# Patient Record
Sex: MALE | Race: WHITE
[De-identification: names, ages, dates, MRNs, and addresses within clinical notes are randomized per-mention and may not be internally consistent; named-entity substitution may affect disease eponyms.]

---

## 2018-02-07 ENCOUNTER — HOSPITAL ENCOUNTER (EMERGENCY)
Dept: HOSPITAL 62 - ER | Age: 38
LOS: 1 days | Discharge: HOME | End: 2018-02-08
Payer: SELF-PAY

## 2018-02-07 VITALS — DIASTOLIC BLOOD PRESSURE: 73 MMHG | SYSTOLIC BLOOD PRESSURE: 147 MMHG

## 2018-02-07 DIAGNOSIS — F19.10: ICD-10-CM

## 2018-02-07 DIAGNOSIS — Z71.1: Primary | ICD-10-CM

## 2018-02-07 DIAGNOSIS — Z79.899: ICD-10-CM

## 2018-02-07 PROCEDURE — 80307 DRUG TEST PRSMV CHEM ANLYZR: CPT

## 2018-02-07 PROCEDURE — 99283 EMERGENCY DEPT VISIT LOW MDM: CPT

## 2018-02-07 PROCEDURE — 81001 URINALYSIS AUTO W/SCOPE: CPT
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
## 2018-02-08 LAB
APPEARANCE UR: CLEAR
APTT PPP: YELLOW S
BARBITURATES UR QL SCN: NEGATIVE
BILIRUB UR QL STRIP: NEGATIVE
GLUCOSE UR STRIP-MCNC: NEGATIVE MG/DL
KETONES UR STRIP-MCNC: NEGATIVE MG/DL
METHADONE UR QL SCN: NEGATIVE
NITRITE UR QL STRIP: NEGATIVE
PCP UR QL SCN: NEGATIVE
PH UR STRIP: 6 [PH] (ref 5–9)
PROT UR STRIP-MCNC: NEGATIVE MG/DL
SP GR UR STRIP: 1.01
URINE BENZODIAZEPINES SCREEN: NEGATIVE
URINE COCAINE SCREEN: NEGATIVE
URINE MARIJUANA (THC) SCREEN: NEGATIVE
UROBILINOGEN UR-MCNC: NEGATIVE MG/DL (ref ?–2)

## 2018-02-08 NOTE — ER DOCUMENT REPORT
HPI





- HPI


Pain Level: Denies


Notes: 





Patient is a 37-year-old male with a history of chronic narcotic issues who 

presents the ED with [facility]'s department for a medical clearance.  Sure states 

that patient took a Suboxone just prior to being arrested.  According to the 

patient he has been on Suboxone chronically and wanted to take it as he was in 

pain and did not want to go through any withdrawal symptoms.  Otherwise he has 

no other concerns or complaints and is feeling well.  No SI/HI.  Patient states 

that he has been eating and drinking without difficulties.  He is urinating 

normally and having normal bowel movements.  Denies any headache, fever, head 

injury, neck pain, LOC, changes in vision/speech/mentation/hearing, URI, sore 

throat, chest pain, palpitations, syncope, cough, shortness of breath, wheeze, 

dyspnea, abdominal pain, nausea/vomiting/diarrhea, urinary retention, dysuria, 

hematuria, loss of control of bowel or bladder, numbness/tingling, saddle 

anesthesia, muscle paralysis/weakness, or rash.





- ROS


Systems Reviewed and Negative: Yes All other systems reviewed and negative





Past Medical History





- General


Information source: Law Enforcement





- Social History


Smoking Status: Unknown if Ever Smoked


Family History: Reviewed & Not Pertinent





Vertical Provider Document





- CONSTITUTIONAL


Agree With Documented VS: Yes


Notes: 





PHYSICAL EXAMINATION:





GENERAL: Well-appearing, well-nourished and in no acute distress.  A&Ox4.  

Answers questions appropriately. 





HEAD: Atraumatic, normocephalic.





EYES: Pupils equal round and reactive to light, extraocular movements intact, 

sclera anicteric, conjunctiva are normal.





ENT: EAC clear b/l.  TM's intact b/l without erythema, fluid, or perforation.  

Nares patent and without discharge.  oropharynx clear without exudates.  No 

tonsilar hypertrophy or erythema.  Moist mucous membranes.  No sinus tenderness.





NECK: Normal range of motion, supple without lymphadenopathy





LUNGS: Breath sounds clear to auscultation bilaterally and equal.  No wheezes 

rales or rhonchi.





HEART: Regular rate and rhythm without murmurs, rubs, gallops.





ABDOMEN: Soft, nontender, nondistended abdomen.  No guarding, no rebound.  No 

masses appreciated.  Normal bowel sounds present.  No CVA tenderness 

bilaterally.





Musculoskeletal: FROM to passive/active. Strength 5+/5. 





Extremities:  No cyanosis, clubbing, or edema b/l.  Peripheral pulses 2+.  

Capillary refill less than 3 seconds.





NEUROLOGICAL: MMSE intact.  Cranial nerves grossly intact.  Normal speech, 

normal gait.  Normal sensory, motor exams 





PSYCH: Normal mood, normal affect.





SKIN: Warm, Dry, normal turgor, no rashes or lesions noted.





- INFECTION CONTROL


TRAVEL OUTSIDE OF THE [place] IN LAST 30 DAYS: No





- RESPIRATORY


O2 Sat by Pulse Oximetry: 97





Course





- Re-evaluation


Re-evalutation: 





02/08/18 00:31


Patient is an afebrile, well-hydrated, 37-year-old male who presents ED for a 

worried well visit.  Vitals are stable.  PE is otherwise unremarkable.  

Urinalysis was unremarkable for any acute pathology.  Urine drug screen was 

performed and opiates was noted.  Patient does take Suboxone as well.  No other 

labs or imaging warranted at this time based on H&P.  No other lab testing 

warranted.  No SI/HI.  No other acute systemic condition suspected at this 

time.  Recheck with a provider in 3-5 days.  Return to the ED with any worsening

/concerning symptoms otherwise as reviewed discharge.  Patient and  are 

in agreement.





- Vital Signs


Vital signs: 


 











Temp Pulse Resp BP Pulse Ox


 


 98.0 F   70   12   147/73 H  97 


 


 02/07/18 21:36  02/07/18 21:36  02/07/18 21:36  02/07/18 21:36  02/07/18 21:36














Discharge





- Discharge


Clinical Impression: 


 Worried well





Condition: Stable


Disposition: HOME, SELF-CARE


Additional Instructions: 


Maintain adequate fluid and food intake


Tylenol/Motrin if needed


Recheck with provider in 2-3 days


Return to the ED with any worsening symptoms and/or development of fever, 

headache, chest pain, palpitations, syncope, shortness of breath, trouble 

breathing, abdominal pain, n/v/d, blood in stool/urine, loss of control of bowel

/bladder, urinary retention, muscle weakness/paralysis, saddle anesthesia, 

numbness/tingling, or other worsening symptoms that are concerning to you.


Forms:  Elevated Blood Pressure


Referrals: 


[facility] [Provider Group] - Follow up as needed


[facility] [Provider Group] - Follow up as needed

## 2020-11-16 ENCOUNTER — HOSPITAL ENCOUNTER (EMERGENCY)
Dept: HOSPITAL 62 - ER | Age: 40
Discharge: HOME | End: 2020-11-16
Payer: SELF-PAY

## 2020-11-16 VITALS — DIASTOLIC BLOOD PRESSURE: 79 MMHG | SYSTOLIC BLOOD PRESSURE: 117 MMHG
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
## 2020-11-16 DIAGNOSIS — T63.511A: ICD-10-CM

## 2020-11-16 DIAGNOSIS — L03.113: ICD-10-CM

## 2020-11-16 DIAGNOSIS — Z23: ICD-10-CM

## 2020-11-16 DIAGNOSIS — L02.413: Primary | ICD-10-CM

## 2020-11-16 LAB
ADD MANUAL DIFF: NO
ALBUMIN SERPL-MCNC: 3.8 G/DL (ref 3.5–5)
ALP SERPL-CCNC: 75 U/L (ref 38–126)
ANION GAP SERPL CALC-SCNC: 9 MMOL/L (ref 5–19)
AST SERPL-CCNC: 48 U/L (ref 17–59)
BASOPHILS # BLD AUTO: 0.1 10^3/UL (ref 0–0.2)
BASOPHILS NFR BLD AUTO: 0.4 % (ref 0–2)
BILIRUB DIRECT SERPL-MCNC: 0.1 MG/DL (ref 0–0.4)
BILIRUB SERPL-MCNC: 0.7 MG/DL (ref 0.2–1.3)
BUN SERPL-MCNC: 16 MG/DL (ref 7–20)
CALCIUM: 9 MG/DL (ref 8.4–10.2)
CHLORIDE SERPL-SCNC: 98 MMOL/L (ref 98–107)
CO2 SERPL-SCNC: 28 MMOL/L (ref 22–30)
EOSINOPHIL # BLD AUTO: 0 10^3/UL (ref 0–0.6)
EOSINOPHIL NFR BLD AUTO: 0 % (ref 0–6)
ERYTHROCYTE [DISTWIDTH] IN BLOOD BY AUTOMATED COUNT: 13.5 % (ref 11.5–14)
GLUCOSE SERPL-MCNC: 130 MG/DL (ref 75–110)
HCT VFR BLD CALC: 37.9 % (ref 37.9–51)
HGB BLD-MCNC: 12.8 G/DL (ref 13.5–17)
LYMPHOCYTES # BLD AUTO: 1 10^3/UL (ref 0.5–4.7)
LYMPHOCYTES NFR BLD AUTO: 5.6 % (ref 13–45)
MCH RBC QN AUTO: 28.1 PG (ref 27–33.4)
MCHC RBC AUTO-ENTMCNC: 33.7 G/DL (ref 32–36)
MCV RBC AUTO: 84 FL (ref 80–97)
MONOCYTES # BLD AUTO: 1.9 10^3/UL (ref 0.1–1.4)
MONOCYTES NFR BLD AUTO: 10.3 % (ref 3–13)
NEUTROPHILS # BLD AUTO: 15.5 10^3/UL (ref 1.7–8.2)
NEUTS SEG NFR BLD AUTO: 83.7 % (ref 42–78)
PLATELET # BLD: 206 10^3/UL (ref 150–450)
POTASSIUM SERPL-SCNC: 4.2 MMOL/L (ref 3.6–5)
PROT SERPL-MCNC: 7 G/DL (ref 6.3–8.2)
RBC # BLD AUTO: 4.54 10^6/UL (ref 4.35–5.55)
TOTAL CELLS COUNTED % (AUTO): 100 %
WBC # BLD AUTO: 18.6 10^3/UL (ref 4–10.5)

## 2020-11-16 PROCEDURE — 10060 I&D ABSCESS SIMPLE/SINGLE: CPT

## 2020-11-16 PROCEDURE — 73090 X-RAY EXAM OF FOREARM: CPT

## 2020-11-16 PROCEDURE — 87040 BLOOD CULTURE FOR BACTERIA: CPT

## 2020-11-16 PROCEDURE — 96372 THER/PROPH/DIAG INJ SC/IM: CPT

## 2020-11-16 PROCEDURE — 96365 THER/PROPH/DIAG IV INF INIT: CPT

## 2020-11-16 PROCEDURE — 87077 CULTURE AEROBIC IDENTIFY: CPT

## 2020-11-16 PROCEDURE — 99284 EMERGENCY DEPT VISIT MOD MDM: CPT

## 2020-11-16 PROCEDURE — 36415 COLL VENOUS BLD VENIPUNCTURE: CPT

## 2020-11-16 PROCEDURE — 87150 DNA/RNA AMPLIFIED PROBE: CPT

## 2020-11-16 PROCEDURE — 90471 IMMUNIZATION ADMIN: CPT

## 2020-11-16 PROCEDURE — 80053 COMPREHEN METABOLIC PANEL: CPT

## 2020-11-16 PROCEDURE — 85025 COMPLETE CBC W/AUTO DIFF WBC: CPT

## 2020-11-16 PROCEDURE — 90715 TDAP VACCINE 7 YRS/> IM: CPT

## 2020-11-16 NOTE — RADIOLOGY REPORT (SQ)
EXAM DESCRIPTION:  FOREARM RIGHT



IMAGES COMPLETED DATE/TIME:  11/16/2020 12:40 pm



REASON FOR STUDY:  By stingray week ago now infected



COMPARISON:  None.



NUMBER OF VIEWS:  Two views.



TECHNIQUE:  Two radiographic images acquired of the right forearm, including elbow and wrist in at le
ast one projection.



LIMITATIONS:  None.



FINDINGS:  MINERALIZATION: Normal.

BONES: No acute fracture.  No worrisome bone lesions.

SOFT TISSUES: There is soft tissue swelling at the area marked.  No radiopaque foreign body is seen.

OTHER: No other significant finding.



IMPRESSION:  Soft tissue swelling.  No foreign body is seen.  No osseous abnormality.



TECHNICAL DOCUMENTATION:  JOB ID:  [id number]

 2011 [employer]- All Rights Reserved



Reading location - IP/workstation name: [email or web address]
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
## 2020-11-16 NOTE — ER DOCUMENT REPORT
ED Medical Screen (RME)





- General


Chief Complaint: Arm Pain


Stated Complaint: ARM SWOLLEN, SKIN PROBLEM


Time Seen by Provider: 11/16/20 12:04


Mode of Arrival: Ambulatory


Information source: Patient


Notes: 





40-year-old male presented to ED for red swollen tight right forearm.  He states

he was stung by a stingray a week ago he did not get follow-up treatment.  He 

states that the arm started swelling about 3 days ago.  He states within the 

last 24 hours he has noticed that the arm has been getting larger by the elbow. 

He states the pain is after changing.  He has movement to the left fingers but 

states is hard to move them.  His left hand is cold.  Patient is alert oriented 

respirations regular nonlabored speaking in full sentences.  I have told the 

charge nurse concerning the condition of this arm.  I have asked her to get him 

a room promptly.














I have greeted and performed a rapid initial assessment of this patient.  A 

comprehensive ED assessment and evaluation of the patient, analysis of test 

results and completion of medical decision making process will be conducted by 

an additional ED providers.


TRAVEL OUTSIDE OF THE [place] IN LAST 30 DAYS: No





- Related Data


Allergies/Adverse Reactions: 


                                        





No Known Allergies Allergy (Verified 11/16/20 12:06)


   











Physical Exam





- Vital signs


Vitals: 





                                        











Temp Pulse Resp BP Pulse Ox


 


 98.0 F   108 H  18   124/67   100 


 


 11/16/20 11:43  11/16/20 11:43  11/16/20 11:43  11/16/20 11:43  11/16/20 11:43














Course





- Vital Signs


Vital signs: 





                                        











Temp Pulse Resp BP Pulse Ox


 


 98.0 F   108 H  18   124/67   100 


 


 11/16/20 11:43  11/16/20 11:43  11/16/20 11:43  11/16/20 11:43  11/16/20 11:43

## 2020-11-17 ENCOUNTER — HOSPITAL ENCOUNTER (INPATIENT)
Dept: HOSPITAL 62 - ER | Age: 40
LOS: 5 days | Discharge: HOME | DRG: 580 | End: 2020-11-22
Attending: INTERNAL MEDICINE | Admitting: FAMILY MEDICINE
Payer: COMMERCIAL

## 2020-11-17 DIAGNOSIS — L03.113: Primary | ICD-10-CM

## 2020-11-17 DIAGNOSIS — B95.62: ICD-10-CM

## 2020-11-17 DIAGNOSIS — L02.413: ICD-10-CM

## 2020-11-17 DIAGNOSIS — Z11.59: ICD-10-CM
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
## 2020-11-17 DIAGNOSIS — F19.130: ICD-10-CM
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
## 2020-11-17 LAB
ADD MANUAL DIFF: NO
ALBUMIN SERPL-MCNC: 3.2 G/DL (ref 3.5–5)
ALP SERPL-CCNC: 72 U/L (ref 38–126)
ANION GAP SERPL CALC-SCNC: 7 MMOL/L (ref 5–19)
AST SERPL-CCNC: 45 U/L (ref 17–59)
BASOPHILS # BLD AUTO: 0 10^3/UL (ref 0–0.2)
BASOPHILS NFR BLD AUTO: 0.4 % (ref 0–2)
BILIRUB DIRECT SERPL-MCNC: 0.2 MG/DL (ref 0–0.4)
BILIRUB SERPL-MCNC: 0.6 MG/DL (ref 0.2–1.3)
BUN SERPL-MCNC: 12 MG/DL (ref 7–20)
CALCIUM: 8.5 MG/DL (ref 8.4–10.2)
CHLORIDE SERPL-SCNC: 99 MMOL/L (ref 98–107)
CO2 SERPL-SCNC: 26 MMOL/L (ref 22–30)
EOSINOPHIL # BLD AUTO: 0 10^3/UL (ref 0–0.6)
EOSINOPHIL NFR BLD AUTO: 0.1 % (ref 0–6)
ERYTHROCYTE [DISTWIDTH] IN BLOOD BY AUTOMATED COUNT: 13.6 % (ref 11.5–14)
GLUCOSE SERPL-MCNC: 138 MG/DL (ref 75–110)
HCT VFR BLD CALC: 36.6 % (ref 37.9–51)
HGB BLD-MCNC: 12.4 G/DL (ref 13.5–17)
INR PPP: 1.19
LYMPHOCYTES # BLD AUTO: 0.8 10^3/UL (ref 0.5–4.7)
LYMPHOCYTES NFR BLD AUTO: 6.7 % (ref 13–45)
MCH RBC QN AUTO: 28.1 PG (ref 27–33.4)
MCHC RBC AUTO-ENTMCNC: 33.9 G/DL (ref 32–36)
MCV RBC AUTO: 83 FL (ref 80–97)
MONOCYTES # BLD AUTO: 1.2 10^3/UL (ref 0.1–1.4)
MONOCYTES NFR BLD AUTO: 10 % (ref 3–13)
NEUTROPHILS # BLD AUTO: 10.3 10^3/UL (ref 1.7–8.2)
NEUTS SEG NFR BLD AUTO: 82.8 % (ref 42–78)
PLATELET # BLD: 190 10^3/UL (ref 150–450)
POTASSIUM SERPL-SCNC: 3.8 MMOL/L (ref 3.6–5)
PROT SERPL-MCNC: 6.1 G/DL (ref 6.3–8.2)
PROTHROMBIN TIME: 15.3 SEC (ref 11.4–15.4)
RBC # BLD AUTO: 4.42 10^6/UL (ref 4.35–5.55)
TOTAL CELLS COUNTED % (AUTO): 100 %
WBC # BLD AUTO: 12.5 10^3/UL (ref 4–10.5)

## 2020-11-17 PROCEDURE — 0J9G0ZZ DRAINAGE OF RIGHT LOWER ARM SUBCUTANEOUS TISSUE AND FASCIA, OPEN APPROACH: ICD-10-PCS | Performed by: ORTHOPAEDIC SURGERY

## 2020-11-17 PROCEDURE — 83605 ASSAY OF LACTIC ACID: CPT

## 2020-11-17 PROCEDURE — 36415 COLL VENOUS BLD VENIPUNCTURE: CPT

## 2020-11-17 PROCEDURE — 87205 SMEAR GRAM STAIN: CPT

## 2020-11-17 PROCEDURE — 93306 TTE W/DOPPLER COMPLETE: CPT

## 2020-11-17 PROCEDURE — 87070 CULTURE OTHR SPECIMN AEROBIC: CPT

## 2020-11-17 PROCEDURE — 87040 BLOOD CULTURE FOR BACTERIA: CPT

## 2020-11-17 PROCEDURE — 85025 COMPLETE CBC W/AUTO DIFF WBC: CPT

## 2020-11-17 PROCEDURE — 80202 ASSAY OF VANCOMYCIN: CPT

## 2020-11-17 PROCEDURE — 87186 SC STD MICRODIL/AGAR DIL: CPT

## 2020-11-17 PROCEDURE — 87077 CULTURE AEROBIC IDENTIFY: CPT

## 2020-11-17 PROCEDURE — 82565 ASSAY OF CREATININE: CPT

## 2020-11-17 PROCEDURE — A9576 INJ PROHANCE MULTIPACK: HCPCS

## 2020-11-17 PROCEDURE — 85652 RBC SED RATE AUTOMATED: CPT

## 2020-11-17 PROCEDURE — 01810 ANES PX NRV MUSC F/ARM WRST: CPT

## 2020-11-17 PROCEDURE — 87075 CULTR BACTERIA EXCEPT BLOOD: CPT

## 2020-11-17 PROCEDURE — 85610 PROTHROMBIN TIME: CPT

## 2020-11-17 PROCEDURE — 87635 SARS-COV-2 COVID-19 AMP PRB: CPT

## 2020-11-17 PROCEDURE — C9803 HOPD COVID-19 SPEC COLLECT: HCPCS

## 2020-11-17 PROCEDURE — 85027 COMPLETE CBC AUTOMATED: CPT

## 2020-11-17 PROCEDURE — 80048 BASIC METABOLIC PNL TOTAL CA: CPT

## 2020-11-17 PROCEDURE — 80053 COMPREHEN METABOLIC PANEL: CPT

## 2020-11-17 PROCEDURE — 86140 C-REACTIVE PROTEIN: CPT

## 2020-11-17 PROCEDURE — 96365 THER/PROPH/DIAG IV INF INIT: CPT

## 2020-11-17 PROCEDURE — 96375 TX/PRO/DX INJ NEW DRUG ADDON: CPT

## 2020-11-17 PROCEDURE — 99285 EMERGENCY DEPT VISIT HI MDM: CPT

## 2020-11-17 RX ADMIN — MORPHINE SULFATE ONE MG: 10 INJECTION INTRAMUSCULAR; INTRAVENOUS; SUBCUTANEOUS at 18:36

## 2020-11-17 RX ADMIN — OXYCODONE AND ACETAMINOPHEN PRN TAB: 5; 325 TABLET ORAL at 21:56

## 2020-11-17 RX ADMIN — CEFAZOLIN SCH MLS/HR: 1 INJECTION, POWDER, FOR SOLUTION INTRAVENOUS at 15:19

## 2020-11-17 RX ADMIN — MORPHINE SULFATE ONE MG: 10 INJECTION INTRAMUSCULAR; INTRAVENOUS; SUBCUTANEOUS at 18:50

## 2020-11-17 RX ADMIN — CEFAZOLIN SCH MLS/HR: 1 INJECTION, POWDER, FOR SOLUTION INTRAVENOUS at 22:07

## 2020-11-17 RX ADMIN — DOXYCYCLINE SCH MLS/HR: 100 INJECTION, POWDER, LYOPHILIZED, FOR SOLUTION INTRAVENOUS at 13:13

## 2020-11-17 RX ADMIN — MORPHINE SULFATE ONE MG: 10 INJECTION INTRAMUSCULAR; INTRAVENOUS; SUBCUTANEOUS at 18:41

## 2020-11-17 RX ADMIN — DOXYCYCLINE SCH MLS/HR: 100 INJECTION, POWDER, LYOPHILIZED, FOR SOLUTION INTRAVENOUS at 22:08

## 2020-11-17 NOTE — ER DOCUMENT REPORT
ED General





- General


Chief Complaint: Wound Recheck


Stated Complaint: WOUND RECHECK


Time Seen by Provider: 11/17/20 09:39


Mode of Arrival: Ambulatory


Information source: Patient


Notes: 





Patient is a 40-year-old male presenting to the emergency department chief 

complaint of wound check.  Patient states that about a week ago he was stung by 

a stingray he was seen here at this hospital yesterday for swelling to the right

upper extremity had incision and drainage of the site however today states that 

the swelling is worse the pain is worse and is here for reevaluation.  Patient 

further states that he has not been able to  his prescription of 

antibiotics or pain medicine because of time constraints last evening.  Patient 

has no other complaints no nausea vomiting diarrhea fevers chills cough or cold 

symptoms.


TRAVEL OUTSIDE OF THE [place] IN LAST 30 DAYS: No





- HPI


Onset: Last week


Onset/Duration: Persistent, Worse


Quality of pain: Throbbing


Severity: Moderate


Pain Level: 4


Associated symptoms: None


Exacerbated by: Movement


Relieved by: Denies


Similar symptoms previously: Yes


Recently seen / treated by doctor: Yes





- Related Data


Allergies/Adverse Reactions: 


                                        





No Known Allergies Allergy (Verified 11/16/20 12:06)


   











Past Medical History





- General


Information source: Patient, [facility] Records





- Social History


Smoking Status: Never Smoker


Chew tobacco use (# tins/day): No


Frequency of alcohol use: Rare


Drug Abuse: None


Family History: Reviewed & Not Pertinent


Patient has suicidal ideation: No


Patient has homicidal ideation: No





Review of Systems





- Review of Systems


Constitutional: No symptoms reported


EENT: No symptoms reported


Cardiovascular: No symptoms reported


Respiratory: No symptoms reported


Gastrointestinal: No symptoms reported


Genitourinary: No symptoms reported


Male Genitourinary: No symptoms reported


Musculoskeletal: See HPI


Skin: No symptoms reported


Hematologic/Lymphatic: See HPI


Neurological/Psychological: No symptoms reported


-: Yes All other systems reviewed and negative





Physical Exam





- Vital signs


Vitals: 


                                        











Temp Pulse Resp BP Pulse Ox


 


 98.8 F   84   18   123/62   100 


 


 11/17/20 08:59  11/17/20 08:59  11/17/20 08:59  11/17/20 08:59  11/17/20 08:59














- Notes


Notes: 





PHYSICAL EXAMINATION:


 


GENERAL: Well-appearing, well-nourished and in no acute distress.


 


HEAD: Atraumatic, normocephalic.


 


EYES: Pupils equal round and reactive to light, extraocular movements intact, 

sclera anicteric, conjunctiva are normal.


 


ENT: nares patent, oropharynx clear without exudates.  Moist mucous membranes.


 


NECK: Normal range of motion, supple without lymphadenopathy, no appreciable JVD


 


LUNGS: Lungs clear to auscultation bilaterally and equal.  No wheezes rales or 

rhonchi.


 


HEART: Tachycardic rate and rhythm without murmurs


 


ABDOMEN: Soft, nontender, normal bowel sounds.  No guarding, no rebound.  No 

masses appreciated.


 


EXTREMITIES: Active full range of motion x3, no pitting or edema.  No cyanosis. 

2+ pulses x4.  Abnormality is right upper extremity is markedly swollen 

erythematous and painful both with and without palpation.  Patient is only able 

to flex and extend at the digits approximately 1 cm capillary refill less than 3

seconds.  The area of erythema is from the tip of the fingers to mid axillary.  

This area was marked with a pen.


 


NEUROLOGICAL: No focal neurological deficits. Moves all extremities 

spontaneously and on command.


 


SKIN: Warm, Dry, and intact. Normal turgor, no rashes or lesions noted.  Except 

as stated above





Course





- Re-evaluation


Re-evalutation: 





11/17/20 11:17


At 11 AM I was able to speak with Dr. Artis orthopedics on-call he has 

requested that the patient have an MRI of the right upper extremity to evaluate 

for possible deep fluid collection.  Shortly thereafter I spoke with the 

hospitalist who is going to come down and evaluate the patient.  Subsequently I 

was able to walk-in and let the patient know his status as of right now I have 

reviewed his laboratory studies and patient is feeling somewhat better he is 

currently resting and is agreeable and aware of the need for admission.


11/17/20 11:43


At this time I spoke to the hospitalist who is agreeable with admission of the 

patient.  Currently pending MRI of right upper extremity and evaluation by 

orthopedics.





- Vital Signs


Vital signs: 


                                        











Temp Pulse Resp BP Pulse Ox


 


 98.8 F   84   18   123/62   100 


 


 11/17/20 08:59  11/17/20 08:59  11/17/20 08:59  11/17/20 08:59  11/17/20 08:59














- Laboratory


Result Diagrams: 


                                 11/17/20 10:08





                                 11/17/20 10:08


Laboratory results interpreted by me: 


                                        











  11/17/20 11/17/20





  10:08 10:08


 


WBC  12.5 H 


 


Hgb  12.4 L 


 


Hct  36.6 L 


 


Lymph % (Auto)  6.7 L 


 


Absolute Neuts (auto)  10.3 H 


 


Seg Neutrophils %  82.8 H 


 


Sodium   132.2 L


 


Glucose   138 H


 


ALT   78 H


 


Total Protein   6.1 L


 


Albumin   3.2 L














- Diagnostic Test


Radiology reviewed: Pending, Reports reviewed





Discharge





- Discharge


Clinical Impression: 


 Cellulitis of right forearm, Swelling of right upper extremity





Condition: Fair


Disposition: ADMITTED AS OBSERVATION


Admitting Provider: Ilana (Hospitalist)


Unit Admitted: Medical Floor

## 2020-11-17 NOTE — RADIOLOGY REPORT (SQ)
MR UPPER EXTREMITY WITHOUT THEN WITH IV CONTRAST



HISTORY: Stingray injury. Drainage and wound in the right

forearm.



COMPARISON: None.



TECHNIQUE: Multiplanar, multisequence MR imaging of the right

forearm was performed without and with the administration of

intravenous gadolinium.



FINDINGS:



There is diffuse subcutaneous edema with enhancement throughout

the right forearm. There are multiple areas of nonenhancement,

however no discrete rim-enhancing fluid collection is identified.

There is surgical packing material within the dorsal subcutaneous

tissues of the mid forearm. No evidence of acute fracture or

osteomyelitis. There is no abnormal intramuscular signal. The

visualized tendons are intact.



IMPRESSION:



1.  Diffuse edema overlying the right forearm which may represent

cellulitis, but without evidence of abscess.

2.  Surgical packing material within the subcutaneous tissues of

the dorsal mid forearm. No additional foreign body is seen.

## 2020-11-17 NOTE — PDOC CONSULTATION
Consultation


Consult Date: 11/17/20


Provider Consulted: PAOLO LARA





History of Present Illness


Admission Date/PCP: 


  11/17/20 12:04





  





Patient complains of: Right forearm infection


History of Present Illness: 


SHERRY BALBUENA is a 40 year old male who sustained a puncture wound to his 

right forearm this past weekend he then presented to emergency room yesterday 

11/16/2020 with a superficial abscess.  Bedside irrigation debridement was 

performed and patient was started on doxycycline.  Patient states over the next 

24 hours his symptoms did not improve continued to have significant pain 

swelling and redness.  Patient presents emergency room once again where he was 

started on IV antibiotics he states these antibiotics did improve his pain 

although continues to have swelling and redness.  Denies numbness or tingling 

but has difficulty moving his finger secondary to the pain.  Pain 5/5.  Notes 

fever and chills.





Social History


Smoking Status: Never Smoker





Family History


Family History: Reviewed & Not Pertinent


Parental Family History Reviewed: No


Children Family History Reviewed: No


Sibling(s) Family History Reviewed.: No





Medication/Allergy


Home Medications: 








Doxycycline Monohydrate 100 mg PO BID #20 capsule 11/16/20 


Oxycodone HCl/Acetaminophen [Percocet 5-325 mg Tablet] 1 tab PO Q6H PRN 3 Days 

#15 tablet 11/16/20 








Allergies/Adverse Reactions: 


                                        





No Known Allergies Allergy (Verified 11/16/20 12:06)


   











Review of Systems


Constitutional: PRESENT: as per HPI.  ABSENT: chills, fever(s), headache(s), 

weight gain, weight loss


Eyes: ABSENT: visual disturbances


Ears: ABSENT: hearing changes


Cardiovascular: ABSENT: chest pain, dyspnea on exertion, edema, orthropnea, 

palpitations


Respiratory: ABSENT: cough, hemoptysis


Gastrointestinal: ABSENT: abdominal pain, constipation, diarrhea, hematemesis, 

hematochezia, nausea, vomiting


Genitourinary: ABSENT: dysuria, hematuria


Integumentary: ABSENT: rash, wounds


Neurological: ABSENT: abnormal gait, abnormal speech, confusion, dizziness, 

focal weakness, syncope


Psychiatric: ABSENT: anxiety, depression, homidical ideation, suicidal ideation


Endocrine: ABSENT: cold intolerance, heat intolerance, menstrual abnormalities, 

polydipsia, polyuria


Hematologic/Lymphatic: ABSENT: easy bleeding, easy bruising, lymphadenopathy





Physical Exam


Vital Signs: 


                                        











Temp Pulse Resp BP Pulse Ox


 


 99.5 F   76   15   112/53 L  98 


 


 11/17/20 13:22  11/17/20 13:22  11/17/20 13:22  11/17/20 13:22  11/17/20 13:22








                                 Intake & Output











 11/16/20 11/17/20 11/18/20





 06:59 06:59 06:59


 


Intake Total   50


 


Balance   50


 


Weight   86.2 kg











General appearance: PRESENT: no acute distress, well-developed, well-nourished


Head exam: PRESENT: atraumatic, normocephalic


Eye exam: PRESENT: conjunctiva pink, EOMI, PERRLA.  ABSENT: scleral icterus


Ear exam: PRESENT: normal external ear exam


Mouth exam: PRESENT: moist, tongue midline


Neck exam: PRESENT: full ROM.  ABSENT: carotid bruit, JVD, lymphadenopathy, 

thyromegaly


Cardiovascular exam: PRESENT: RRR.  ABSENT: diastolic murmur, rubs, systolic 

murmur


Pulses: PRESENT: normal dorsalis pedis pul, +2 pedal pulses bilateral


Vascular exam: PRESENT: normal capillary refill


GI/Abdominal exam: PRESENT: normal bowel sounds, soft.  ABSENT: distended, 

guarding, mass, organolmegaly, rebound, tenderness


Rectal exam: PRESENT: deferred


Musculoskeletal exam: PRESENT: other -  compressible no sign of compartment s

yndrome.


Additional comments: 





Right forearm: 3 cm incision noted along the dorsal aspect of the forearm with 

surrounding erythema.  Erythema improving compared to demarcated area.  Palpable

axillary lymph node.  Tenderness with palpation.  Compartments swollen but 

compressible no sign of compartment syndrome.  Intact IP/MP flexion/extension.  

EPL/FPL intact however patient unable to make full composite fist.  Radial pulse

2+.  Cap refill less than 2 seconds.  Mild pain posteriorly with passive 

stretch.


Neurological exam: PRESENT: alert, awake, oriented to person, oriented to place,

oriented to time, oriented to situation, CN II-XII grossly intact.  ABSENT: 

motor sensory deficit


Psychiatric exam: PRESENT: appropriate affect, normal mood.  ABSENT: homicidal 

ideation, suicidal ideation


Skin exam: PRESENT: dry, intact, warm.  ABSENT: cyanosis, rash





Results


Laboratory Results: 


                                        





                                 11/17/20 10:08 





                                 11/17/20 10:08 





                                        











  11/17/20 11/17/20 11/17/20





  10:08 10:08 10:08


 


WBC  12.5 H  


 


RBC  4.42  


 


Hgb  12.4 L  


 


Hct  36.6 L  


 


MCV  83  


 


MCH  28.1  


 


MCHC  33.9  


 


RDW  13.6  


 


Plt Count  190  


 


Seg Neutrophils %  82.8 H  


 


Sodium   132.2 L 


 


Potassium   3.8 


 


Chloride   99 


 


Carbon Dioxide   26 


 


Anion Gap   7 


 


BUN   12 


 


Creatinine   0.58 


 


Est GFR ( Amer)   > 60 


 


Glucose   138 H 


 


Lactic Acid    1.6


 


Calcium   8.5 


 


Total Bilirubin   0.6 


 


AST   45 


 


Alkaline Phosphatase   72 


 


Total Protein   6.1 L 


 


Albumin   3.2 L 














Assessment & Plan





- Diagnosis


(1) Abscess of right forearm


Is this a current diagnosis for this admission?: Yes   


Plan: 


Patient has evidence of persistent right forearm abscess despite irrigation and 

debridement yesterday the emergency room his symptoms have worsened.  At this 

point I have recommended obtaining MRI to evaluate for deeper fluid collection 

in the meantime he only should be prepared for surgery which would include 

irrigation Beemer of the right forearm.  Risk and benefits of surgical procedure

have been explained patient is verbalized understanding consented for surgical p

rocedure.  Risk of surgical procedure neurovascular is, postoperative pain, 

postoperative stiffness, worsening infection requiring additional surgical 

intervention.  Rapid Covid test will be performed in anticipation for surgical 

procedure.  Patient will be admitted for IV antibiotics as well.

## 2020-11-17 NOTE — OPERATIVE REPORT
Operative Report


DATE OF SURGERY: 11/17/20


PREOPERATIVE DIAGNOSIS: Right forearm abscess


POSTOPERATIVE DIAGNOSIS: Same


OPERATION: Irrigation and debridement right forearm abscess


SURGEON: PAOLO LARA


ANESTHESIA: GA


TISSUE REMOVED OR ALTERED: Aerobic anaerobic cultures


COMPLICATIONS: 





None


ESTIMATED BLOOD LOSS: Minimal


PROCEDURE: 





Indication for above procedure:





40-year-old male who apparently sustained a stingray injury to his right 

forearm.  Was seen at the emergency room where bedside irrigation debridement 

was performed however patient continued to have discomfort and swelling thus was

admitted to the hospital service given patient's worsening swelling decision was

made to proceed with operative intervention risk and benefits of surgical 

procedure were explained patient verbalized understanding consented for surgery 

procedure.





Procedure In Detail:





Patient was seen and evaluated in the preoperative holding area.  The RIGHT 

upper extremity was initialized and marked.  Patient received 2g of Ancef IV for

bacterial prophylaxis.  Patient was taken back to the operative room where 

transferred to the operative table and placed under general anesthesia.  Once 

they were adequately anesthetized a nonsterile tourniquet was placed on the 

upper extremity.  A surgical team debriefing was performed ensuring all 

instrumentation was available, the surgical procedure was discussed with 

possible concerns reviewed.  The upper extremity was prepped with Betadine and 

draped in a sterile fashion.   A timeout was done identifying correct patient, 

procedure and extremity everyone in attendance agree with this and verbalized no

concerns. The extremity was elevated the tourniquet was inflated to 250 mmHg.





Previous incision was extended proximally and distally 3 cm respectively.  Blunt

dissection was performed.  There was injury to a small branch of the posterior 

cutaneous nerve of the forearm adjacent to the saphenous vein.  Small venous 

tributaries were coagulated with cautery.  The defect within the fascia along 

the ECRB and ECRL was then opened.  Approximately 8 cc of purulent material was 

expressed there is no evidence of deep track to the bone or deep to the fascia 

of the extensor mechanism.  There is no tracking proximal to the forearm or 

along the volar compartments.  Any nonviable tissue deep to the fascia was 

excised.  Aerobic and anaerobic cultures were obtained.  Wound was irrigated 

with 1 L of normal saline with bacitracin.  Tourniquet was then deflated.  

Residual peripheral bleeding was controlled with cautery.  Skin incision was 

closed with interrupted 3-0 nylon suture and a Penrose drain placed along the 

central aspect of the wound.  Xeroform, gauze and soft dressing was placed.





Sponge counts, instrument counts, needle counts were correct.  Patient was then 

awoken from anesthesia.  Transferred from the operating room table to the 

operating room stretcher.  There was no intraoperative complications patient 

tolerated procedure well stable to PACU.





Postop plan:





Patient will continue on IV antibiotics until he sees clinical improvement plan 

will be for drain removal in 48 hours possibly discharge in 48-72 hours.

## 2020-11-18 LAB
ANION GAP SERPL CALC-SCNC: 13 MMOL/L (ref 5–19)
BUN SERPL-MCNC: 7 MG/DL (ref 7–20)
CALCIUM: 9.1 MG/DL (ref 8.4–10.2)
CHLORIDE SERPL-SCNC: 102 MMOL/L (ref 98–107)
CO2 SERPL-SCNC: 24 MMOL/L (ref 22–30)
ERYTHROCYTE [DISTWIDTH] IN BLOOD BY AUTOMATED COUNT: 13.6 % (ref 11.5–14)
GLUCOSE SERPL-MCNC: 119 MG/DL (ref 75–110)
HCT VFR BLD CALC: 38.3 % (ref 37.9–51)
HGB BLD-MCNC: 12.8 G/DL (ref 13.5–17)
MCH RBC QN AUTO: 27.8 PG (ref 27–33.4)
MCHC RBC AUTO-ENTMCNC: 33.4 G/DL (ref 32–36)
MCV RBC AUTO: 83 FL (ref 80–97)
PLATELET # BLD: 220 10^3/UL (ref 150–450)
POTASSIUM SERPL-SCNC: 4.5 MMOL/L (ref 3.6–5)
RBC # BLD AUTO: 4.6 10^6/UL (ref 4.35–5.55)
WBC # BLD AUTO: 15.6 10^3/UL (ref 4–10.5)

## 2020-11-18 RX ADMIN — CEFAZOLIN SCH MLS/HR: 1 INJECTION, POWDER, FOR SOLUTION INTRAVENOUS at 03:38

## 2020-11-18 RX ADMIN — CEFAZOLIN SCH MLS/HR: 1 INJECTION, POWDER, FOR SOLUTION INTRAVENOUS at 16:06

## 2020-11-18 RX ADMIN — DOXYCYCLINE SCH MLS/HR: 100 INJECTION, POWDER, LYOPHILIZED, FOR SOLUTION INTRAVENOUS at 11:04

## 2020-11-18 RX ADMIN — OXYCODONE AND ACETAMINOPHEN PRN TAB: 5; 325 TABLET ORAL at 12:16

## 2020-11-18 RX ADMIN — DOXYCYCLINE SCH MLS/HR: 100 INJECTION, POWDER, LYOPHILIZED, FOR SOLUTION INTRAVENOUS at 22:48

## 2020-11-18 RX ADMIN — MORPHINE SULFATE PRN MG: 10 INJECTION INTRAMUSCULAR; INTRAVENOUS; SUBCUTANEOUS at 02:06

## 2020-11-18 RX ADMIN — OXYCODONE AND ACETAMINOPHEN PRN TAB: 5; 325 TABLET ORAL at 17:58

## 2020-11-18 RX ADMIN — KETOROLAC TROMETHAMINE SCH MG: 30 INJECTION, SOLUTION INTRAMUSCULAR at 21:06

## 2020-11-18 RX ADMIN — OXYCODONE AND ACETAMINOPHEN PRN TAB: 5; 325 TABLET ORAL at 04:47

## 2020-11-18 RX ADMIN — CEFAZOLIN SCH MLS/HR: 1 INJECTION, POWDER, FOR SOLUTION INTRAVENOUS at 21:07

## 2020-11-18 RX ADMIN — KETOROLAC TROMETHAMINE SCH MG: 30 INJECTION, SOLUTION INTRAMUSCULAR at 08:56

## 2020-11-18 RX ADMIN — CEFAZOLIN SCH MLS/HR: 1 INJECTION, POWDER, FOR SOLUTION INTRAVENOUS at 08:56

## 2020-11-18 RX ADMIN — MORPHINE SULFATE PRN MG: 10 INJECTION INTRAMUSCULAR; INTRAVENOUS; SUBCUTANEOUS at 22:57

## 2020-11-18 RX ADMIN — KETOROLAC TROMETHAMINE SCH MG: 30 INJECTION, SOLUTION INTRAMUSCULAR at 16:06

## 2020-11-18 NOTE — PDOC PROGRESS REPORT
Subjective


Date:: 11/18/20


Reason For Visit: 


RIGHT ARM CELLULITIS








Physical Exam


Vital Signs: 


                                        











Temp Pulse Resp BP Pulse Ox


 


 97.3 F   57 L  20   102/52 L  99 


 


 11/18/20 11:19  11/18/20 11:19  11/18/20 11:19  11/18/20 11:19  11/18/20 11:19








                                 Intake & Output











 11/17/20 11/18/20 11/19/20





 06:59 06:59 06:59


 


Intake Total  2950 


 


Output Total  1805 


 


Balance  1145 


 


Weight  80.7 kg 











General appearance: PRESENT: no acute distress, well-developed, well-nourished


Head exam: PRESENT: atraumatic, normocephalic


Eye exam: PRESENT: conjunctiva pink, EOMI, PERRLA.  ABSENT: scleral icterus


Ear exam: PRESENT: normal external ear exam


Mouth exam: PRESENT: moist, tongue midline


Neck exam: ABSENT: carotid bruit, JVD, lymphadenopathy, thyromegaly


Respiratory exam: PRESENT: clear to auscultation stephanie.  ABSENT: rales, rhonchi, 

wheezes


Cardiovascular exam: PRESENT: RRR, +S1, +S2.  ABSENT: diastolic murmur, rubs, 

systolic murmur


Pulses: PRESENT: normal dorsalis pedis pul


Vascular exam: PRESENT: normal capillary refill


GI/Abdominal exam: PRESENT: normal bowel sounds, soft.  ABSENT: distended, 

guarding, mass, organolmegaly, rebound, tenderness


Rectal exam: PRESENT: deferred


Extremities exam: PRESENT: other - RUE in dressing.  ABSENT: calf tenderness, 

clubbing, pedal edema


Neurological exam: PRESENT: alert, awake, oriented to person, oriented to place,

oriented to time, oriented to situation, CN II-XII grossly intact.  ABSENT: 

motor sensory deficit


Psychiatric exam: PRESENT: appropriate affect, normal mood.  ABSENT: homicidal 

ideation, suicidal ideation


Skin exam: PRESENT: dry, intact, warm.  ABSENT: cyanosis, rash





Results


Laboratory Results: 


                                        





                                 11/18/20 04:18 





                                 11/18/20 04:18 





                                        











  11/18/20 11/18/20





  04:18 04:18


 


WBC  15.6 H 


 


RBC  4.60 


 


Hgb  12.8 L 


 


Hct  38.3 


 


MCV  83 


 


MCH  27.8 


 


MCHC  33.4 


 


RDW  13.6 


 


Plt Count  220 


 


Sodium   139.1


 


Potassium   4.5


 


Chloride   102


 


Carbon Dioxide   24


 


Anion Gap   13


 


BUN   7


 


Creatinine   0.59


 


Est GFR (African Amer)   > 60


 


Glucose   119 H


 


Calcium   9.1








                                        





11/17/20 09:50   Arm - Right   Gram Stain - Final








Impressions: 


                                        





Upper Extremity MRI  11/17/20 11:06


IMPRESSION:


 


1.  Diffuse edema overlying the right forearm which may represent


cellulitis, but without evidence of abscess.


2.  Surgical packing material within the subcutaneous tissues of


the dorsal mid forearm. No additional foreign body is seen.


 














Assessment and Plan





- Diagnosis


(1) Cellulitis of right forearm


Is this a current diagnosis for this admission?: Yes   





(2) Abscess of right forearm


Is this a current diagnosis for this admission?: Yes   





- Plan Summary


Summary: 


His wound culture is currently growing gram-positive cocci in clusters as well 

as gram-negative rods.  Current blood culture is currently negative however 

patient had blood culture on 16 November which grew Candida Krusei.  He is 

status post I&D of the right forearm abscess.  He is on cefazolin although I do 

not have the sensitivities back yet.  Fairly concerned that he is not on MRSA 

coverage however since he seems to be relatively stable I will follow the 

cultures closely and change antibiotics as needed.  Patient also noted to have 

positive blood cultures for Candida however he has absolutely no clinical 

symptoms I will suggest to fungemia.  Blood cultures done the very next day is 

currently negative.  Patient has not been treated with any antifungal agents.





- Time


Time Spent with patient: 15-24 minutes


Medications reviewed and adjusted accordingly: Yes


Anticipated Discharge Disposition: Home, Self Care


Anticipated Discharge Timeframe: within 72 hours

## 2020-11-18 NOTE — PDOC PROGRESS REPORT
Subjective


Date:: 11/18/20


Subjective:: 





Patient seen and examined morning.  Reports continued pain.  No acute events ove

rnight.


Reason For Visit: 


RIGHT ARM CELLULITIS








Physical Exam


Vital Signs: 


                                        











Temp Pulse Resp BP Pulse Ox


 


 97.6 F   58 L  16   115/65   100 


 


 11/18/20 03:30  11/18/20 03:30  11/18/20 03:30  11/18/20 03:30  11/18/20 03:30








                                 Intake & Output











 11/17/20 11/18/20 11/19/20





 06:59 06:59 06:59


 


Intake Total  2950 


 


Output Total  1805 


 


Balance  1145 


 


Weight  80.7 kg 











Physical Exam: 





No acute distress, no x3





Right upper extremity sensation grossly intact to radial median and ulnar nerve.


upper extremity motor function grossly intact to radian median ulnar nerve AIN 

and PIN


Pulses 2+, capillary refill less than 2 seconds


Compartments soft


Dressing clean dry and intact





Results


Laboratory Results: 


                                        





                                 11/18/20 04:18 





                                 11/18/20 04:18 





                                        











  11/17/20 11/17/20 11/17/20





  10:08 10:08 10:08


 


WBC  12.5 H  


 


RBC  4.42  


 


Hgb  12.4 L  


 


Hct  36.6 L  


 


MCV  83  


 


MCH  28.1  


 


MCHC  33.9  


 


RDW  13.6  


 


Plt Count  190  


 


Seg Neutrophils %  82.8 H  


 


Sodium   132.2 L 


 


Potassium   3.8 


 


Chloride   99 


 


Carbon Dioxide   26 


 


Anion Gap   7 


 


BUN   12 


 


Creatinine   0.58 


 


Est GFR ( Amer)   > 60 


 


Glucose   138 H 


 


Lactic Acid    1.6


 


Calcium   8.5 


 


Total Bilirubin   0.6 


 


AST   45 


 


Alkaline Phosphatase   72 


 


Total Protein   6.1 L 


 


Albumin   3.2 L 














  11/18/20 11/18/20





  04:18 04:18


 


WBC  15.6 H 


 


RBC  4.60 


 


Hgb  12.8 L 


 


Hct  38.3 


 


MCV  83 


 


MCH  27.8 


 


MCHC  33.4 


 


RDW  13.6 


 


Plt Count  220 


 


Seg Neutrophils %  


 


Sodium   139.1


 


Potassium   4.5


 


Chloride   102


 


Carbon Dioxide   24


 


Anion Gap   13


 


BUN   7


 


Creatinine   0.59


 


Est GFR (African Amer)   > 60


 


Glucose   119 H


 


Lactic Acid  


 


Calcium   9.1


 


Total Bilirubin  


 


AST  


 


Alkaline Phosphatase  


 


Total Protein  


 


Albumin  











Impressions: 


                                        





Upper Extremity MRI  11/17/20 11:06


IMPRESSION:


 


1.  Diffuse edema overlying the right forearm which may represent


cellulitis, but without evidence of abscess.


2.  Surgical packing material within the subcutaneous tissues of


the dorsal mid forearm. No additional foreign body is seen.


 














Assessment & Plan





- Diagnosis


(1) Abscess of right forearm


Is this a current diagnosis for this admission?: Yes   


Plan: 


We will follow cultures


Multimodal pain control


Potentially remove drain tomorrow and allow for discharge pending microbiology 

results.


Encourage range of motion and use of the right upper extremity








- Time


Time Spent with patient: Less than 15 minutes

## 2020-11-19 LAB
ANION GAP SERPL CALC-SCNC: 11 MMOL/L (ref 5–19)
BUN SERPL-MCNC: 11 MG/DL (ref 7–20)
CALCIUM: 8.9 MG/DL (ref 8.4–10.2)
CHLORIDE SERPL-SCNC: 103 MMOL/L (ref 98–107)
CO2 SERPL-SCNC: 25 MMOL/L (ref 22–30)
ERYTHROCYTE [DISTWIDTH] IN BLOOD BY AUTOMATED COUNT: 13.6 % (ref 11.5–14)
GLUCOSE SERPL-MCNC: 112 MG/DL (ref 75–110)
HCT VFR BLD CALC: 36.6 % (ref 37.9–51)
HGB BLD-MCNC: 12.5 G/DL (ref 13.5–17)
MCH RBC QN AUTO: 28.6 PG (ref 27–33.4)
MCHC RBC AUTO-ENTMCNC: 34.2 G/DL (ref 32–36)
MCV RBC AUTO: 84 FL (ref 80–97)
PLATELET # BLD: 232 10^3/UL (ref 150–450)
POTASSIUM SERPL-SCNC: 4 MMOL/L (ref 3.6–5)
RBC # BLD AUTO: 4.37 10^6/UL (ref 4.35–5.55)
WBC # BLD AUTO: 10.5 10^3/UL (ref 4–10.5)

## 2020-11-19 RX ADMIN — OXYCODONE AND ACETAMINOPHEN PRN TAB: 5; 325 TABLET ORAL at 01:51

## 2020-11-19 RX ADMIN — KETOROLAC TROMETHAMINE SCH MG: 30 INJECTION, SOLUTION INTRAMUSCULAR at 21:57

## 2020-11-19 RX ADMIN — DOXYCYCLINE SCH MLS/HR: 100 INJECTION, POWDER, LYOPHILIZED, FOR SOLUTION INTRAVENOUS at 09:25

## 2020-11-19 RX ADMIN — METHADONE HYDROCHLORIDE SCH MG: 10 TABLET ORAL at 15:40

## 2020-11-19 RX ADMIN — METHADONE HYDROCHLORIDE SCH MG: 10 TABLET ORAL at 11:37

## 2020-11-19 RX ADMIN — KETOROLAC TROMETHAMINE SCH MG: 30 INJECTION, SOLUTION INTRAMUSCULAR at 03:23

## 2020-11-19 RX ADMIN — METHADONE HYDROCHLORIDE SCH MG: 10 TABLET ORAL at 21:58

## 2020-11-19 RX ADMIN — DOXYCYCLINE SCH MLS/HR: 100 INJECTION, POWDER, LYOPHILIZED, FOR SOLUTION INTRAVENOUS at 21:58

## 2020-11-19 RX ADMIN — CEFAZOLIN SCH MLS/HR: 1 INJECTION, POWDER, FOR SOLUTION INTRAVENOUS at 03:24

## 2020-11-19 RX ADMIN — VANCOMYCIN HYDROCHLORIDE SCH MLS/HR: 1 INJECTION, POWDER, LYOPHILIZED, FOR SOLUTION INTRAVENOUS at 12:44

## 2020-11-19 RX ADMIN — OXYCODONE AND ACETAMINOPHEN PRN TAB: 5; 325 TABLET ORAL at 23:35

## 2020-11-19 RX ADMIN — KETOROLAC TROMETHAMINE SCH MG: 30 INJECTION, SOLUTION INTRAMUSCULAR at 09:25

## 2020-11-19 RX ADMIN — VANCOMYCIN HYDROCHLORIDE SCH MLS/HR: 1 INJECTION, POWDER, LYOPHILIZED, FOR SOLUTION INTRAVENOUS at 17:29

## 2020-11-19 RX ADMIN — KETOROLAC TROMETHAMINE SCH MG: 30 INJECTION, SOLUTION INTRAMUSCULAR at 15:41

## 2020-11-19 NOTE — PDOC PROGRESS REPORT
Subjective


Date:: 11/19/20


Subjective:: 





Appears to be having some withdrawal symptoms at this time. Is a heavy IV drug a

buse as well as oral fentanyl abuser


Reason For Visit: 


RIGHT ARM CELLULITIS








Physical Exam


Vital Signs: 


                                        











Temp Pulse Resp BP Pulse Ox


 


 97.3 F   55 L  20   117/68   100 


 


 11/19/20 07:37  11/19/20 07:37  11/19/20 07:37  11/19/20 07:37  11/19/20 07:37








                                 Intake & Output











 11/18/20 11/19/20 11/20/20





 06:59 06:59 06:59


 


Intake Total 2950 1000 


 


Output Total 1805 500 


 


Balance 1145 500 


 


Weight 80.7 kg 79.9 kg 











General appearance: PRESENT: no acute distress, well-nourished


Head exam: PRESENT: atraumatic, normocephalic


Eye exam: PRESENT: conjunctiva pink, EOMI, PERRLA.  ABSENT: scleral icterus


Ear exam: PRESENT: normal external ear exam


Mouth exam: PRESENT: moist, tongue midline


Neck exam: ABSENT: carotid bruit, JVD, lymphadenopathy, thyromegaly


Respiratory exam: PRESENT: clear to auscultation stephanie.  ABSENT: rales, rhonchi, 

wheezes


Cardiovascular exam: PRESENT: RRR, +S1, +S2.  ABSENT: diastolic murmur, rubs, 

systolic murmur


Pulses: PRESENT: normal dorsalis pedis pul


Vascular exam: PRESENT: normal capillary refill


GI/Abdominal exam: PRESENT: normal bowel sounds, soft.  ABSENT: distended, 

guarding, mass, organolmegaly, rebound, tenderness


Rectal exam: PRESENT: deferred


Extremities exam: PRESENT: other - RUE swelling, dressing, mobile fingers.  

ABSENT: calf tenderness, clubbing, pedal edema


Musculoskeletal exam: PRESENT: ambulatory


Neurological exam: PRESENT: alert, awake, oriented to person, oriented to place,

oriented to time, oriented to situation, CN II-XII grossly intact.  ABSENT: 

motor sensory deficit


Psychiatric exam: PRESENT: appropriate affect, normal mood.  ABSENT: homicidal 

ideation, suicidal ideation


Skin exam: PRESENT: dry, intact, warm.  ABSENT: cyanosis, rash





Results


Laboratory Results: 


                                        





                                 11/19/20 04:05 





                                 11/19/20 04:05 





                                        











  11/19/20 11/19/20





  04:05 04:05


 


WBC  10.5 


 


RBC  4.37 


 


Hgb  12.5 L 


 


Hct  36.6 L 


 


MCV  84 


 


MCH  28.6 


 


MCHC  34.2 


 


RDW  13.6 


 


Plt Count  232 


 


Sodium   138.5


 


Potassium   4.0


 


Chloride   103


 


Carbon Dioxide   25


 


Anion Gap   11


 


BUN   11


 


Creatinine   0.56


 


Est GFR (African Amer)   > 60


 


Glucose   112 H


 


Calcium   8.9








                                        





11/17/20 09:50   Arm - Right   Gram Stain - Final


11/17/20 09:50   Arm - Right   Wound Culture - Final


                            Mrsa (Meth Resis Staph Aureus)


                            Serratia Marcescens


11/17/20 18:00   Forearm - Right   Gram Stain - Final


11/17/20 18:00   Forearm - Right   Wound Culture - Final


                            Mrsa (Meth Resis Staph Aureus)


                            Serratia Marcescens


                            No Anaerobic Organisms








Impressions: 


                                        





Upper Extremity MRI  11/17/20 11:06


IMPRESSION:


 


1.  Diffuse edema overlying the right forearm which may represent


cellulitis, but without evidence of abscess.


2.  Surgical packing material within the subcutaneous tissues of


the dorsal mid forearm. No additional foreign body is seen.


 














Assessment and Plan





- Diagnosis


(1) Cellulitis of right forearm


Is this a current diagnosis for this admission?: Yes   





(2) Abscess of right forearm


Is this a current diagnosis for this admission?: Yes   





(3) Bacteremia due to Gram-positive bacteria


Is this a current diagnosis for this admission?: Yes   


Plan: 


We will repeat blood cultures as only 1 bottle is positive out of 4. We will 

also obtain an echocardiogram given patient's history of IVDA








(4) Intravenous drug abuse, continuous


Is this a current diagnosis for this admission?: Yes   


Plan: 


Continue to monitor for signs of withdrawal and start patient on methadone to 

help with his withdrawal symptoms








- Plan Summary


Summary: 


His wound culture is currently growing gram-positive cocci in clusters as well 

as gram-negative rods.  Current blood culture is currently negative however 

patient had blood culture on 16 November which grew Candida Krusei.  He is 

status post I&D of the right forearm abscess.  He is on cefazolin although I do 

not have the sensitivities back yet.  Fairly concerned that he is not on MRSA 

coverage however since he seems to be relatively stable I will follow the 

cultures closely and change antibiotics as needed.  Patient also noted to have 

positive blood cultures for Candida however he has absolutely no clinical 

symptoms I will suggest to fungemia.  Blood cultures done the very next day is 

currently negative.  Patient has not been treated with any antifungal agents.





11/19


Patient appears to be having some withdrawal symptoms. He is sniffling and 

sneezing. He says he takes a heavy dose of heroine as well as p.o. fentanyl 

street drugs.





Wound cultures currently yielding MRSA as well as Serratia with no anaerobic 

organisms. Blood cultures yielding gram-positive cocci preliminary result. 

Antibiotics have been changed to vancomycin as well as ceftriaxone.





Leukocytosis is resolved currently with his white count down to 10.5 from a high

of 15.6 vital signs are currently stable


Repeat blood culture remains negative for fungemia





- Time


Time Spent with patient: 15-24 minutes


Medications reviewed and adjusted accordingly: Yes


Anticipated Discharge Disposition: Home, Self Care


Anticipated Discharge Timeframe: TBD

## 2020-11-19 NOTE — PDOC PROGRESS REPORT
Subjective


Date:: 11/19/20


Subjective:: 





Patient seen and examined.  No acute events overnight.  No worsening symptoms.  

Does report continued right forearm pain.


Reason For Visit: 


RIGHT ARM CELLULITIS








Physical Exam


Vital Signs: 


                                        











Temp Pulse Resp BP Pulse Ox


 


 97.3 F   55 L  20   117/68   100 


 


 11/19/20 07:37  11/19/20 07:37  11/19/20 07:37  11/19/20 07:37  11/19/20 07:37








                                 Intake & Output











 11/18/20 11/19/20 11/20/20





 06:59 06:59 06:59


 


Intake Total 2950 1000 


 


Output Total 1805 500 


 


Balance 1145 500 


 


Weight 80.7 kg 79.9 kg 











Physical Exam: 





No acute distress, alert and orient x3





Right upper extremity sensation grossly intact to radial median and ulnar nerve.


upper extremity motor function grossly intact to radian median ulnar nerve AIN 

and PIN


Pulses 2+, capillary refill less than 2 seconds


No deformity noted full range of motion of the elbow shoulder wrist and fingers 

without pain


Compartments soft, tenderness to palpation is present over the dorsum of the 

forearm.  The dressing was removed as well as the drain.  There is some slight 

semi-purulent sanguinous fluid about the dressing when it was removed.  Nothing 

could be expressed from the wound.  No induration or recurrent collection.  

Proximal and distal aspect of the wound is healing well with the small opening 

from the drain in the middle.








Results


Laboratory Results: 


                                        





                                 11/19/20 04:05 





                                 11/19/20 04:05 





                                        











  11/19/20 11/19/20





  04:05 04:05


 


WBC  10.5 


 


RBC  4.37 


 


Hgb  12.5 L 


 


Hct  36.6 L 


 


MCV  84 


 


MCH  28.6 


 


MCHC  34.2 


 


RDW  13.6 


 


Plt Count  232 


 


Sodium   138.5


 


Potassium   4.0


 


Chloride   103


 


Carbon Dioxide   25


 


Anion Gap   11


 


BUN   11


 


Creatinine   0.56


 


Est GFR (African Amer)   > 60


 


Glucose   112 H


 


Calcium   8.9








                                        





11/17/20 09:50   Arm - Right   Gram Stain - Final


11/17/20 09:50   Arm - Right   Wound Culture - Final


                            Mrsa (Meth Resis Staph Aureus)


                            Serratia Marcescens


11/17/20 18:00   Forearm - Right   Gram Stain - Final


11/17/20 18:00   Forearm - Right   Wound Culture - Final


                            Mrsa (Meth Resis Staph Aureus)


                            Serratia Marcescens


                            No Anaerobic Organisms








Impressions: 


                                        





Upper Extremity MRI  11/17/20 11:06


IMPRESSION:


 


1.  Diffuse edema overlying the right forearm which may represent


cellulitis, but without evidence of abscess.


2.  Surgical packing material within the subcutaneous tissues of


the dorsal mid forearm. No additional foreign body is seen.


 














Assessment & Plan





- Diagnosis


(1) Abscess of right forearm


Is this a current diagnosis for this admission?: Yes   


Plan: 


Drain removed today.


We will continue to follow


Monitor for any potential drainage, will likely be able to be discharged home 

if wound remains stable


Ordering a baseline ESR and CRP for further monitoring.  If there is continued 

drainage or concern for persistent infection, may need to trend ESR and CRP.


Antibiotic recommendations per infectious disease or hospitalist.


Cultures growing MRSA as Serratia








- Time


Time Spent with patient: Less than 15 minutes

## 2020-11-20 LAB
ANION GAP SERPL CALC-SCNC: 11 MMOL/L (ref 5–19)
BUN SERPL-MCNC: 14 MG/DL (ref 7–20)
CALCIUM: 8.9 MG/DL (ref 8.4–10.2)
CHLORIDE SERPL-SCNC: 105 MMOL/L (ref 98–107)
CO2 SERPL-SCNC: 22 MMOL/L (ref 22–30)
ERYTHROCYTE [DISTWIDTH] IN BLOOD BY AUTOMATED COUNT: 13.6 % (ref 11.5–14)
GLUCOSE SERPL-MCNC: 89 MG/DL (ref 75–110)
HCT VFR BLD CALC: 37.3 % (ref 37.9–51)
HGB BLD-MCNC: 12.5 G/DL (ref 13.5–17)
MCH RBC QN AUTO: 28.1 PG (ref 27–33.4)
MCHC RBC AUTO-ENTMCNC: 33.4 G/DL (ref 32–36)
MCV RBC AUTO: 84 FL (ref 80–97)
PLATELET # BLD: 287 10^3/UL (ref 150–450)
POTASSIUM SERPL-SCNC: 4.7 MMOL/L (ref 3.6–5)
RBC # BLD AUTO: 4.43 10^6/UL (ref 4.35–5.55)
VANCOMYCIN,TROUGH: 9.2 UG/ML (ref 5–20)
WBC # BLD AUTO: 8.6 10^3/UL (ref 4–10.5)

## 2020-11-20 RX ADMIN — METHADONE HYDROCHLORIDE SCH MG: 10 TABLET ORAL at 22:04

## 2020-11-20 RX ADMIN — OXYCODONE AND ACETAMINOPHEN PRN TAB: 5; 325 TABLET ORAL at 08:20

## 2020-11-20 RX ADMIN — VANCOMYCIN HYDROCHLORIDE SCH MLS/HR: 1 INJECTION, POWDER, LYOPHILIZED, FOR SOLUTION INTRAVENOUS at 18:27

## 2020-11-20 RX ADMIN — MORPHINE SULFATE PRN MG: 10 INJECTION INTRAMUSCULAR; INTRAVENOUS; SUBCUTANEOUS at 11:24

## 2020-11-20 RX ADMIN — OXYCODONE AND ACETAMINOPHEN PRN TAB: 5; 325 TABLET ORAL at 14:40

## 2020-11-20 RX ADMIN — VANCOMYCIN HYDROCHLORIDE SCH MLS/HR: 1 INJECTION, POWDER, LYOPHILIZED, FOR SOLUTION INTRAVENOUS at 11:12

## 2020-11-20 RX ADMIN — METHADONE HYDROCHLORIDE SCH MG: 10 TABLET ORAL at 05:30

## 2020-11-20 RX ADMIN — METHADONE HYDROCHLORIDE SCH MG: 10 TABLET ORAL at 14:39

## 2020-11-20 RX ADMIN — KETOROLAC TROMETHAMINE SCH MG: 30 INJECTION, SOLUTION INTRAMUSCULAR at 08:20

## 2020-11-20 RX ADMIN — KETOROLAC TROMETHAMINE SCH MG: 30 INJECTION, SOLUTION INTRAMUSCULAR at 02:28

## 2020-11-20 RX ADMIN — OXYCODONE AND ACETAMINOPHEN PRN TAB: 5; 325 TABLET ORAL at 21:02

## 2020-11-20 RX ADMIN — VANCOMYCIN HYDROCHLORIDE SCH MLS/HR: 1 INJECTION, POWDER, LYOPHILIZED, FOR SOLUTION INTRAVENOUS at 09:09

## 2020-11-20 RX ADMIN — MORPHINE SULFATE PRN MG: 10 INJECTION INTRAMUSCULAR; INTRAVENOUS; SUBCUTANEOUS at 18:27

## 2020-11-20 RX ADMIN — METHADONE HYDROCHLORIDE SCH: 10 TABLET ORAL at 16:09

## 2020-11-20 RX ADMIN — KETOROLAC TROMETHAMINE SCH MG: 30 INJECTION, SOLUTION INTRAMUSCULAR at 14:40

## 2020-11-20 RX ADMIN — KETOROLAC TROMETHAMINE SCH MG: 30 INJECTION, SOLUTION INTRAMUSCULAR at 21:01

## 2020-11-20 RX ADMIN — VANCOMYCIN HYDROCHLORIDE SCH MLS/HR: 1 INJECTION, POWDER, LYOPHILIZED, FOR SOLUTION INTRAVENOUS at 02:27

## 2020-11-20 RX ADMIN — DOXYCYCLINE SCH MLS/HR: 100 INJECTION, POWDER, LYOPHILIZED, FOR SOLUTION INTRAVENOUS at 09:12

## 2020-11-20 NOTE — PDOC PROGRESS REPORT
Subjective


Date:: 11/20/20


Subjective:: 





Patient's pain and discomfort has improved although continues to have some resid

ual swelling.  Denies numbness or tingling.  Has been taking pain medication 

regularly with some relief.  Denies fever chills or sweats.


Reason For Visit: 


RIGHT ARM CELLULITIS








Physical Exam


Vital Signs: 


                                        











Temp Pulse Resp BP Pulse Ox


 


 97.7 F   53 L  18   113/56 L  99 


 


 11/20/20 11:23  11/20/20 11:23  11/20/20 11:23  11/20/20 11:23  11/20/20 11:23








                                 Intake & Output











 11/19/20 11/20/20 11/21/20





 06:59 06:59 06:59


 


Intake Total 1000 1750 500


 


Output Total 500 215 


 


Balance 500 1535 500


 


Weight 79.9 kg 79.9 kg 











Musculoskeletal exam: PRESENT: other - Right forearm: 1.5 cm open wound 

centrally with serosanguineous drainage no purulent drainage.  Previous erythema

and swelling has significantly improved.  Compartment soft and compressible no 

sign of compartment syndrome.  Intact IP/MP flexion/extension.  No pain with 

passive stretch.





Results


Laboratory Results: 


                                        





                                 11/20/20 04:24 





                                 11/20/20 04:24 





                                        











  11/19/20 11/20/20 11/20/20





  12:30 04:24 04:24


 


WBC   8.6 


 


RBC   4.43 


 


Hgb   12.5 L 


 


Hct   37.3 L 


 


MCV   84 


 


MCH   28.1 


 


MCHC   33.4 


 


RDW   13.6 


 


Plt Count   287 


 


Sodium    138.1


 


Potassium    4.7


 


Chloride    105


 


Carbon Dioxide    22


 


Anion Gap    11


 


BUN    14


 


Creatinine    0.62


 


Est GFR ( Amer)    > 60


 


Glucose    89


 


Calcium    8.9


 


C-Reactive Protein  51.2 H  








                                        





11/17/20 10:08   Blood   Blood Culture - Final


                            Aerococcus Species


11/17/20 09:50   Arm - Right   Gram Stain - Final


11/17/20 09:50   Arm - Right   Wound Culture - Final


                            Mrsa (Meth Resis Staph Aureus)


                            Serratia Marcescens


11/17/20 18:00   Forearm - Right   Gram Stain - Final


11/17/20 18:00   Forearm - Right   Wound Culture - Final


                            Mrsa (Meth Resis Staph Aureus)


                            Serratia Marcescens


                            No Anaerobic Organisms








Impressions: 


                                        





Upper Extremity MRI  11/17/20 11:06


IMPRESSION:


 


1.  Diffuse edema overlying the right forearm which may represent


cellulitis, but without evidence of abscess.


2.  Surgical packing material within the subcutaneous tissues of


the dorsal mid forearm. No additional foreign body is seen.


 














Assessment & Plan





- Diagnosis


(1) Abscess of right forearm


Is this a current diagnosis for this admission?: Yes   


Plan: 


Patient doing well status post irrigation debridement his swelling in 

preoperative findings have notably improved.  His postoperative MRI demonstrated

no evidence of deep abscess.  At this point he will continue daily dressing 

changes.  We will transition to p.o. antibiotics tomorrow and may follow-up with

me as an outpatient in 2 weeks.  Patient is cleared for discharge from 

orthopedic standpoint 11/21/2020. 








- Time


Time Spent with patient: Less than 15 minutes

## 2020-11-20 NOTE — PDOC PROGRESS REPORT
Subjective


Date:: 11/20/20


Subjective:: 





He is a heavy IV drug abuse as well as oral fentanyl abuser.  He appears to be d

oing better today.  He is requesting to have his methadone dose increased which 

I have done temporarily.  I hope to discharge him in another day or 2 pending 

his repeat blood culture result.  Although this is a possible contaminant I 

think it is reasonable given his history to make sure that he does not have a 

secondary infection


Reason For Visit: 


RIGHT ARM CELLULITIS








Physical Exam


Vital Signs: 


                                        











Temp Pulse Resp BP Pulse Ox


 


 97.7 F   53 L  18   113/56 L  99 


 


 11/20/20 11:23  11/20/20 11:23  11/20/20 11:23  11/20/20 11:23  11/20/20 11:23








                                 Intake & Output











 11/19/20 11/20/20 11/21/20





 06:59 06:59 06:59


 


Intake Total 1000 1750 500


 


Output Total 500 215 


 


Balance 500 1535 500


 


Weight 79.9 kg 79.9 kg 











General appearance: PRESENT: no acute distress, well-developed, well-nourished


Head exam: PRESENT: atraumatic, normocephalic


Eye exam: PRESENT: conjunctiva pink, EOMI, PERRLA.  ABSENT: scleral icterus


Ear exam: PRESENT: normal external ear exam


Mouth exam: PRESENT: moist, tongue midline


Neck exam: ABSENT: carotid bruit, JVD, lymphadenopathy, thyromegaly


Respiratory exam: PRESENT: clear to auscultation stephanie.  ABSENT: rales, rhonchi, 

wheezes


Cardiovascular exam: PRESENT: RRR, +S1, +S2.  ABSENT: diastolic murmur, rubs, 

systolic murmur


Pulses: PRESENT: normal dorsalis pedis pul


Vascular exam: PRESENT: normal capillary refill


GI/Abdominal exam: PRESENT: normal bowel sounds, soft.  ABSENT: distended, guar

ding, mass, organolmegaly, rebound, tenderness


Rectal exam: PRESENT: deferred


Extremities exam: PRESENT: full ROM, other - RUE swelling improved.  ABSENT: 

calf tenderness, clubbing, pedal edema


Neurological exam: PRESENT: alert, awake, oriented to person, oriented to place,

oriented to time, oriented to situation, CN II-XII grossly intact.  ABSENT: 

motor sensory deficit


Psychiatric exam: PRESENT: appropriate affect, normal mood.  ABSENT: homicidal 

ideation, suicidal ideation


Skin exam: PRESENT: dry, intact, warm.  ABSENT: cyanosis, rash





Results


Laboratory Results: 


                                        





                                 11/20/20 04:24 





                                 11/20/20 04:24 





                                        











  11/20/20 11/20/20





  04:24 04:24


 


WBC  8.6 


 


RBC  4.43 


 


Hgb  12.5 L 


 


Hct  37.3 L 


 


MCV  84 


 


MCH  28.1 


 


MCHC  33.4 


 


RDW  13.6 


 


Plt Count  287 


 


Sodium   138.1


 


Potassium   4.7


 


Chloride   105


 


Carbon Dioxide   22


 


Anion Gap   11


 


BUN   14


 


Creatinine   0.62


 


Est GFR (African Amer)   > 60


 


Glucose   89


 


Calcium   8.9








                                        





11/17/20 10:08   Blood   Blood Culture - Final


                            Aerococcus Species


11/17/20 09:50   Arm - Right   Gram Stain - Final


11/17/20 09:50   Arm - Right   Wound Culture - Final


                            Mrsa (Meth Resis Staph Aureus)


                            Serratia Marcescens


11/17/20 18:00   Forearm - Right   Gram Stain - Final


11/17/20 18:00   Forearm - Right   Wound Culture - Final


                            Mrsa (Meth Resis Staph Aureus)


                            Serratia Marcescens


                            No Anaerobic Organisms








Impressions: 


                                        





Upper Extremity MRI  11/17/20 11:06


IMPRESSION:


 


1.  Diffuse edema overlying the right forearm which may represent


cellulitis, but without evidence of abscess.


2.  Surgical packing material within the subcutaneous tissues of


the dorsal mid forearm. No additional foreign body is seen.


 














Assessment and Plan





- Diagnosis


(1) Cellulitis of right forearm


Is this a current diagnosis for this admission?: Yes   





(2) Abscess of right forearm


Is this a current diagnosis for this admission?: Yes   





(3) Bacteremia due to Gram-positive bacteria


Is this a current diagnosis for this admission?: Yes   





(4) Intravenous drug abuse, continuous


Is this a current diagnosis for this admission?: Yes   





- Plan Summary


Summary: 


His wound culture is currently growing gram-positive cocci in clusters as well 

as gram-negative rods.  Current blood culture is currently negative however 

patient had blood culture on 16 November which grew Candida Krusei.  He is 

status post I&D of the right forearm abscess.  He is on cefazolin although I do 

not have the sensitivities back yet.  Fairly concerned that he is not on MRSA 

coverage however since he seems to be relatively stable I will follow the 

cultures closely and change antibiotics as needed.  Patient also noted to have 

positive blood cultures for Candida however he has absolutely no clinical 

symptoms I will suggest to fungemia.  Blood cultures done the very next day is c

urrently negative.  Patient has not been treated with any antifungal agents.





11/19


Patient appears to be having some withdrawal symptoms. He is sniffling and 

sneezing. He says he takes a heavy dose of heroine as well as p.o. fentanyl 

street drugs.





Wound cultures currently yielding MRSA as well as Serratia with no anaerobic 

organisms. Blood cultures yielding gram-positive cocci preliminary result. 

Antibiotics have been changed to vancomycin as well as ceftriaxone.





Leukocytosis is resolved currently with his white count down to 10.5 from a high

of 15.6 vital signs are currently stable


Repeat blood culture remains negative for fungemia





11/20 discussed with Dr. Barber.  He suggest that patient can be discharged 

home for outpatient follow-up in about 2 weeks.  He also suggested as an inpa

tient home with 2-week course of antibiotics.  At this time I will repeat his 

blood culture and reassess in a.m.





- Time


Time Spent with patient: 15-24 minutes


Anticipated Discharge Disposition: Home, Self Care


Anticipated Discharge Timeframe: within 48 hours

## 2020-11-21 RX ADMIN — KETOROLAC TROMETHAMINE SCH MG: 30 INJECTION, SOLUTION INTRAMUSCULAR at 08:19

## 2020-11-21 RX ADMIN — METHADONE HYDROCHLORIDE SCH MG: 10 TABLET ORAL at 13:28

## 2020-11-21 RX ADMIN — METHADONE HYDROCHLORIDE SCH MG: 10 TABLET ORAL at 21:11

## 2020-11-21 RX ADMIN — CIPROFLOXACIN HYDROCHLORIDE SCH MG: 750 TABLET, FILM COATED ORAL at 21:41

## 2020-11-21 RX ADMIN — METHADONE HYDROCHLORIDE SCH MG: 10 TABLET ORAL at 05:28

## 2020-11-21 RX ADMIN — KETOROLAC TROMETHAMINE SCH MG: 30 INJECTION, SOLUTION INTRAMUSCULAR at 15:01

## 2020-11-21 RX ADMIN — VANCOMYCIN HYDROCHLORIDE SCH MLS/HR: 1 INJECTION, POWDER, LYOPHILIZED, FOR SOLUTION INTRAVENOUS at 17:06

## 2020-11-21 RX ADMIN — VANCOMYCIN HYDROCHLORIDE SCH MLS/HR: 1 INJECTION, POWDER, LYOPHILIZED, FOR SOLUTION INTRAVENOUS at 02:21

## 2020-11-21 RX ADMIN — KETOROLAC TROMETHAMINE SCH MG: 30 INJECTION, SOLUTION INTRAMUSCULAR at 02:19

## 2020-11-21 RX ADMIN — KETOROLAC TROMETHAMINE SCH MG: 30 INJECTION, SOLUTION INTRAMUSCULAR at 21:11

## 2020-11-21 RX ADMIN — OXYCODONE AND ACETAMINOPHEN PRN TAB: 5; 325 TABLET ORAL at 15:00

## 2020-11-21 RX ADMIN — MORPHINE SULFATE PRN MG: 10 INJECTION INTRAMUSCULAR; INTRAVENOUS; SUBCUTANEOUS at 13:51

## 2020-11-21 RX ADMIN — OXYCODONE AND ACETAMINOPHEN PRN TAB: 5; 325 TABLET ORAL at 08:18

## 2020-11-21 RX ADMIN — VANCOMYCIN HYDROCHLORIDE SCH MLS/HR: 1 INJECTION, POWDER, LYOPHILIZED, FOR SOLUTION INTRAVENOUS at 10:09

## 2020-11-21 RX ADMIN — SULFAMETHOXAZOLE AND TRIMETHOPRIM SCH TAB: 800; 160 TABLET ORAL at 21:11

## 2020-11-21 NOTE — PDOC PROGRESS REPORT
Subjective


Date:: 11/21/20


Subjective:: 





He is a heavy IV drug abuse as well as oral fentanyl abuser.  He appears to be d

oing better today.  He is requesting to have his methadone dose increased which 

I have done temporarily.  I hope to discharge him in another day or 2 pending 

his repeat blood culture result.  Although this is a possible contaminant I 

think it is reasonable given his history to make sure that he does not have a 

secondary infection


Reason For Visit: 


RIGHT ARM CELLULITIS








Physical Exam


Vital Signs: 


                                        











Temp Pulse Resp BP Pulse Ox


 


 97.8 F   62   18   122/58 L  100 


 


 11/21/20 08:27  11/21/20 13:03  11/21/20 13:03  11/21/20 13:03  11/21/20 13:03








                                 Intake & Output











 11/20/20 11/21/20 11/22/20





 06:59 06:59 06:59


 


Intake Total 1750 2110 1336


 


Output Total 215  


 


Balance 1535 2110 1336


 


Weight 79.9 kg 81.8 kg 











General appearance: PRESENT: no acute distress, well-developed, well-nourished


Head exam: PRESENT: atraumatic, normocephalic


Eye exam: PRESENT: conjunctiva pink, EOMI, PERRLA.  ABSENT: scleral icterus


Ear exam: PRESENT: normal external ear exam


Mouth exam: PRESENT: moist, tongue midline


Neck exam: ABSENT: carotid bruit, JVD, lymphadenopathy, thyromegaly


Respiratory exam: PRESENT: clear to auscultation stephanie.  ABSENT: rales, rhonchi, 

wheezes


Cardiovascular exam: PRESENT: RRR.  ABSENT: diastolic murmur, rubs, systolic 

murmur


Pulses: PRESENT: normal dorsalis pedis pul


Vascular exam: PRESENT: normal capillary refill


GI/Abdominal exam: PRESENT: normal bowel sounds, soft.  ABSENT: distended, 

guarding, mass, organolmegaly, rebound, tenderness


Rectal exam: PRESENT: deferred


Extremities exam: PRESENT: full ROM, other - RUE in dressing.  ABSENT: calf 

tenderness, clubbing, pedal edema, tenderness


Neurological exam: PRESENT: alert, awake, oriented to person, oriented to place,

oriented to time, oriented to situation, CN II-XII grossly intact.  ABSENT: 

motor sensory deficit


Psychiatric exam: PRESENT: appropriate affect, normal mood.  ABSENT: homicidal 

ideation, suicidal ideation


Skin exam: PRESENT: dry, intact, warm.  ABSENT: cyanosis, rash





Results


Laboratory Results: 


                                        





                                 11/20/20 04:24 





                                 11/20/20 09:45 





                                        











  11/20/20





  09:45


 


Creatinine  0.53


 


Est GFR ( Amer)  > 60











Impressions: 


                                        





Upper Extremity MRI  11/17/20 11:06


IMPRESSION:


 


1.  Diffuse edema overlying the right forearm which may represent


cellulitis, but without evidence of abscess.


2.  Surgical packing material within the subcutaneous tissues of


the dorsal mid forearm. No additional foreign body is seen.


 














Assessment and Plan





- Diagnosis


(1) Cellulitis of right forearm


Is this a current diagnosis for this admission?: Yes   





(2) Abscess of right forearm


Is this a current diagnosis for this admission?: Yes   





(3) Bacteremia due to Gram-positive bacteria


Is this a current diagnosis for this admission?: Yes   





(4) Intravenous drug abuse, continuous


Is this a current diagnosis for this admission?: Yes   





- Plan Summary


Summary: 


His wound culture is currently growing gram-positive cocci in clusters as well 

as gram-negative rods.  Current blood culture is currently negative however 

patient had blood culture on 16 November which grew Candida Krusei.  He is 

status post I&D of the right forearm abscess.  He is on cefazolin although I do 

not have the sensitivities back yet.  Fairly concerned that he is not on MRSA 

coverage however since he seems to be relatively stable I will follow the 

cultures closely and change antibiotics as needed.  Patient also noted to have 

positive blood cultures for Candida however he has absolutely no clinical 

symptoms I will suggest to fungemia.  Blood cultures done the very next day is 

currently negative.  Patient has not been treated with any antifungal agents.





11/19


Patient appears to be having some withdrawal symptoms. He is sniffling and 

sneezing. He says he takes a heavy dose of heroine as well as p.o. fentanyl 

street drugs.





Wound cultures currently yielding MRSA as well as Serratia with no anaerobic 

organisms. Blood cultures yielding gram-positive cocci preliminary result. 

Antibiotics have been changed to vancomycin as well as ceftriaxone.





Leukocytosis is resolved currently with his white count down to 10.5 from a high

of 15.6 vital signs are currently stable


Repeat blood culture remains negative for fungemia





11/20 discussed with Dr. Barber.  He suggest that patient can be discharged 

home for outpatient follow-up in about 2 weeks.  He also suggested as an 

inpatient home with 2-week course of antibiotics.  At this time I will repeat 

his blood culture and reassess in a.m.





11/21


Infectious disease assessment and Recommendations:





Patient evaluated for right forearm stingray injury with development of purulent

cellulitis/abscess.  He is s/p incision and drainage x 2.  Tissue cultures for 

MRSA and Serratia maercescens.  Blood culture positive for Aerococcus spp, not 

isolated from the wound raising concerns for contamination vs true pathogen.  

Considering that this organisms can cause severe infections, it is reasonable to

treat it.  As it was low grade bacteremia and no stigmata of endocarditis, will 

not recommend TTE at this time.  The best regimen for this infection would be 

linezolid 600 mg po bid + ciprofloxacin 750 mg po every 12 hr for 10 days.  

Bactrim DS bid + ciprofloxacin 750 mg po every 12 hr may be an alternative with 

the same duration of therapy.  Please call back if any questions.





Patient apparently had a stingray infection however it does have a history of IV

drug abuse.  He has already been seen by surgery.  Your ED had an echocardiogram

done the results of which is pending.  Blood culture result is also pending.  I 

will keep the patient overnight and if he remains stable and blood culture 

returns negative tomorrow which would make it 48 hours he will be discharged 

home as per infectious disease recommendation.  We will start him on Bactrim 

twice daily as well as ciprofloxacin 750 mg every 12 hours for duration of 10 

days as per above








- Time


Time Spent with patient: 15-24 minutes


Medications reviewed and adjusted accordingly: Yes


Anticipated Discharge Disposition: Home, Self Care


Anticipated Discharge Timeframe: within 24 hours

## 2020-11-21 NOTE — XCELERA REPORT
[address] [address]

                               Tel: [phone]

                               Fax: [phone]



                      Transthoracic Echocardiogram Report

_______________________________________________________________________________



Name: SHERRY BALBUENA

MRN: [id number]                           Age: 40 yrs

Gender: Male                              : 1980

Patient Status: Inpatient                 Patient Location: [address] [place], [place] [address]

Account #: [id number]

Study Date: 2020 02:04 PM

Accession #: [id number]

_______________________________________________________________________________



Height: 78 in        Weight: 176 lb        BSA: 2.1 m2

_______________________________________________________________________________

Procedure: A complete two-dimensional transthoracic echocardiogram was

performed (2D, M-mode, spectral and color flow Doppler). The study was

technically adequate with some images being suboptimal in quality.

Reason For Study: IVDA, Bacteremia





Ordering Physician: BHARATH LOJA

Performed By: Cristiane Quach



_______________________________________________________________________________



Interpretation Summary

Interpretation Summary

TRANS THORACIC ECHOCARDIOGRAM



FINDINGS: Technically fair to difficult study.



LEFT VENTRICLE:

LV Systolic function: LVEF is felt to be within normal limits.

Best estimate is approximately LVEF is 60 %.

LV Diastolic Function: Not adequate assessed.

Wall motion : No definite regional wall motion abnormalities are noted,

however not all wall segment were well visualized.

Left ventricular chamber size : is within normal limit.

Left ventricular wall thickness : WNL.

INTERVENTRICULAR SEPTUM: no evidence of VSD noted. No asymmetric hypertrophy

noted.



RIGHT VENTRICLE:

RV systolic function : is felt to be within normal limit.

Right Ventricle Size : borderline dilated.



LEFT ATRIUM size : Borderline dilated.



RIGHT ATRIUM size : is within normal limit.



INTER ATRIAL SEPTUM : No definite atrial septal defect noted however a small

PFO could be missed.



AORTIC ROOT : seems to be within normal limits.



Ascending aorta is not well visualized.



INFERIOR VENA CAVA: WNL with normal phasic respiratory variation.



VALVES:



MITRAL VALVE : Leaflets are mildly thickened. Mobility seems to be within

normal limits.

Mitral Regurgitation : Trace mitral regurgitation is noted.

Mitral Stenosis: No mitral stenosis noted.

Mitral valve prolapse : none noted.



AORTIC VALVE: seems to be trileaflet with thickening but adequate excursion.

Aortic stenosis : No aortic stenosis noted.

Aortic regurgitation : No aortic incompetence noted.



TRICUSPID VALVE : mobility and structures within normal limit.

Tricuspid stenosis : no tricuspid stenosis noted.

Tricuspid regurgitation : Trace tricuspid regurgitation noted.

Estimated RVSP : tricuspid jet envelope not well defined to measure RV

systolic pressure accurately.



PULMONARY VALVE : was not well visualized but no significant abnormalities

suspected.

Pulmonary stenosis : no significant pulmonary stenosis noted.

Pulmonary regurgitation : no significant pulmonary regurgitation noted.



MASSES AND THROMBUS : No definite intracardiac thrombus or masses are noted.



PERICARDIUM: Minimal pericardial effusion was noted.





IMPRESSION :

1. Normal LVEF.

2. Normal LV wall thickness noted.

3. Diastolic function not adequately assessed.

4. No significant valvular stenosis or regurgitation noted.

5. No definite vegetations noted but if clinical suspicion is high

obtain,multiple blood culture and consider SUNDEEP.



MMode/2D Measurements & Calculations

RVDd: 2.7 cm         LVIDd: 5.6 cm     FS: 35.1 %         Ao root diam:

IVSd: 1.0 cm         LVIDs: 3.6 cm     EDV(Teich):        3.1 cm

                                       154.9 ml           Ao root area:

                     LVPWd: 0.83 cm

                                       ESV(Teich):        7.7 cm2

                                       56.2 ml

                                       EF(Teich): 63.7 %

        _______________________________________________________________

EDV(MOD-sp4):        SV(MOD-sp4):

124.6 ml             80.2 ml

ESV(MOD-sp4):

44.5 ml

EF(MOD-sp4): 64.3 %



Doppler Measurements & Calculations

MV E max gualberto:      MV dec slope:         Ao V2 max:         LV V1 max P.1 cm/sec                              129.8 cm/sec       6.2 mmHg

MV A max gualberto:      374.7 cm/sec2         Ao max P.7 mmHgLV V1 max:

57.9 cm/sec        MV dec time: 0.19 sec                    124.1 cm/sec

MV E/A: 1.2



        _______________________________________________________________

PA V2 max:         PI end-d gualberto:         TR max gualberto:

82.0 cm/sec        90.9 cm/sec           238.7 cm/sec

PA max P.7 mmHg                      TR max P.8 mmHg



Left Ventricle

The left ventricle is grossly normal size. The left ventricular ejection

fraction is normal. Doppler measurements suggest normal left ventricular

diastolic function. The left ventricular wall motion is normal.



Right Ventricle

The right ventricle is grossly normal size. There is normal right ventricular

wall thickness. The right ventricular systolic function is normal.





Atria

The right atrium is normal in size. The left atrial size is normal. The

interatrial septum is difficult to see, but appears to be grossly normal.



Mitral Valve

The mitral valve is grossly normal. There is no mitral valve stenosis. There

is a trace amount of mitral regurgitation.



Aortic Valve

The aortic valve is grossly normal. There is no aortic valve stenosis. No

aortic regurgitation is present.



Tricuspid Valve

The tricuspid valve is not well visualized, but is grossly normal. There is no

tricuspid stenosis. There is a trace or physiologic amount of tricuspid

regurgitation.





Pulmonic Valve

The pulmonic valve is not well seen, but is grossly normal. There is no

pulmonic valvular stenosis. There is a trace or physiologic amount of pulmonic

regurgitation.



Great Vessels

The aortic root is normal size. The inferior vena cava appeared normal.



Effusions

There is no pericardial effusion.



Incidental Findings

No definite vegetations noted but if clinical suspicion is high, then consider

SUNDEEP and multiple blood cultures.





_______________________________________________________________________________

_______________________________________________________________________________



Electronically signed by:      Rick Jaimes      on 2020 03:31 PM



CC: BHARATH LOJA Shyamal

## 2020-11-21 NOTE — PROGRESS NOTE
Provider Note


Provider Note: 








[facility] Telephone Advice Consultation





Chart reviewed, patient not seen.





This is a 40-year-old man that was admitted after suffering a stingray injury.  

He developed redness, swelling, drainage and pain.  He was evaluated in the ED 

and found with right forearm cellulitis and small abscess. He had a bedside I&D.

 He had leukocytosis. Symptoms did not improve for which  patient was evaluated 

by orthopedics who recommended MRI of the right arm. MRI showed right forearm 

soft tissue swelling but no organized fluid collections. He was taken to the OR 

for I&D.  There was no fluid tracking to the fascia or the bone.  Tissue 

cultures positive for MRSA and Serratia marcescens.  Blood culture with 1/4 

bottles with Aerococcus spp. Patient has been on vancomycin and doxycycline.  He

has been afebrile, HD stable, leukocytosis resolved.  ESR 31 and CRP 51. ID 

consulted for recommendations.





Allergies:





No Known Allergies Allergy (Verified 11/16/20 12:06)


   


Medications:





No Home Medications  11/17/20 





Vital Signs:














Temp Pulse Resp BP Pulse Ox


 


 97.6 F   48 L  16   103/60   98 


 


 11/21/20 00:05  11/21/20 00:05  11/21/20 00:05  11/21/20 00:05  11/21/20 00:05








                                 Intake & Output











 11/19/20 11/20/20 11/21/20





 06:59 06:59 06:59


 


Intake Total 1000 1750 1760


 


Output Total 500 215 


 


Balance 500 1535 1760


 


Weight 79.9 kg 79.9 kg 








                                  Weight/Height





Weight                           79.9 kg


Height                           6 ft 6 in





Laboratories:





                                 11/20/20 04:24 





                                 11/20/20 09:45 





                                        











MCV  84 fl (80-97)   11/20/20  04:24    


 


MCH  28.1 pg (27.0-33.4)   11/20/20  04:24    


 


MCHC  33.4 g/dL (32.0-36.0)   11/20/20  04:24    


 


RDW  13.6 % (11.5-14.0)   11/20/20  04:24    


 


Seg Neutrophils %  82.8 % (42-78)  H  11/17/20  10:08    


 


Chloride  105 mmol/L ()   11/20/20  04:24    


 


Carbon Dioxide  22 mmol/L (22-30)   11/20/20  04:24    


 


Anion Gap  11  (5-19)   11/20/20  04:24    


 


Est GFR ( Amer)  > 60  (>60)   11/20/20  09:45    


 


Glucose  89 mg/dL ()   11/20/20  04:24    


 


Lactic Acid  1.6 mmol/L (0.7-2.1)   11/17/20  10:08    


 


Calcium  8.9 mg/dL (8.4-10.2)   11/20/20  04:24    


 


Total Bilirubin  0.6 mg/dL (0.2-1.3)   11/17/20  10:08    


 


AST  45 U/L (17-59)   11/17/20  10:08    


 


Alkaline Phosphatase  72 U/L ()   11/17/20  10:08    


 


C-Reactive Protein  51.2 mg/L (<10.0)  H  11/19/20  12:30    


 


Total Protein  6.1 g/dL (6.3-8.2)  L  11/17/20  10:08    


 


Albumin  3.2 g/dL (3.5-5.0)  L  11/17/20  10:08    








                                        





Microbiology:





Blood cultures





11/17   Aerococcus spp 1/4





Tissue cultures





11/17   MRSA, Serratia marcescens





Radiology:





Upper Extremity MRI  11/17/20 11:06


IMPRESSION:


 


1.  Diffuse edema overlying the right forearm which may represent


cellulitis, but without evidence of abscess.


2.  Surgical packing material within the subcutaneous tissues of


the dorsal mid forearm. No additional foreign body is seen.


 


Assessment and Recommendations:





Patient evaluated for right forearm stingray injury with development of purulent

cellulitis/abscess.  He is s/p incision and drainage x 2.  Tissue cultures for 

MRSA and Serratia maercescens.  Blood culture positive for Aerococcus spp, not 

isolated from the wound raising concerns for contamination vs true pathogen.  

Considering that this organisms can cause severe infections, it is reasonable to

treat it.  As it was low grade bacteremia and no stigmata of endocarditis, will 

not recommend TTE at this time.  The best regimen for this infection would be 

linezolid 600 mg po bid + ciprofloxacin 750 mg po every 12 hr for 10 days.  

Bactrim DS bid + ciprofloxacin 750 mg po every 12 hr may be an alternative with 

the same duration of therapy.  Please call back if any questions.








Simi Thornton MD


[facility]


[id number]

## 2020-11-21 NOTE — PDOC H&P
History of Present Illness


Admission Date/PCP: 


  11/17/20 12:04





  





History of Present Illness: 


SHERRY BALBUENA is a 40 year old male  who presents to the 

ER a few days after being stung in the right forearm by a stingray as he was 

hauling a net onto a boat.  He said he feels like and about an inch and a half 

and came right back out.  He rested out and put a bandage over it and kept 

looking.  The swelling kept getting worse eventually his wife convinced him to 

come to the hospital to get it checked out.  He had evidence of obvious 

cellulitis over his entire right forearm he was seen in consultation by 

orthopedics for possible debridement.








Past Medical History


Psychiatric Medical History: 


   Denies: Depression





Social History


Smoking Status: Never Smoker


Electronic Cigarette use?: No


Frequency of Alcohol Use: None


Hx Recreational Drug Use: No


Hx Prescription Drug Abuse: No





- Advance Directive


Resuscitation Status: Full Code





Family History


Family History: Reviewed & Not Pertinent


Parental Family History Reviewed: Yes


Children Family History Reviewed: Yes


Sibling(s) Family History Reviewed.: Yes





Medication/Allergy


Home Medications: 








No Home Medications  11/17/20 








Allergies/Adverse Reactions: 


                                        





No Known Allergies Allergy (Verified 11/16/20 12:06)


   











Review of Systems


All systems: reviewed and no additional remarkable complaints except as stated -

All systems were reviewed and were negative except as noted in the HPI





Physical Exam


Vital Signs: 


                                        











Temp Pulse Resp BP Pulse Ox


 


 97.8 F   56 L  15   124/70   99 


 


 11/21/20 08:27  11/21/20 16:33  11/21/20 16:33  11/21/20 16:33  11/21/20 16:33








                                 Intake & Output











 11/20/20 11/21/20 11/22/20





 06:59 06:59 06:59


 


Intake Total 1750 2110 1586


 


Output Total 215  


 


Balance 1535 2110 1586


 


Weight 79.9 kg 81.8 kg 











General appearance: PRESENT: no acute distress, cooperative


Head exam: PRESENT: atraumatic, normocephalic


Eye exam: PRESENT: EOMI, PERRLA.  ABSENT: conjunctival injection, nystagmus, 

scleral icterus


Ear exam: PRESENT: normal external ear exam


Mouth exam: PRESENT: moist, neck supple


Throat exam: ABSENT: post pharyngeal erythema


Neck exam: PRESENT: full ROM.  ABSENT: carotid bruit, JVD, lymphadenopathy, 

meningismus, tenderness, thyromegaly


Respiratory exam: PRESENT: clear to auscultation stephanie, symmetrical, unlabored.  

ABSENT: accessory muscle use, chest wall tenderness, crackles, prolonged 

expiratory phas, rhonchi, tachypnea, wheezes


Cardiovascular exam: PRESENT: RRR, +S1, +S2


Pulses: PRESENT: normal carotid pulses


Vascular exam: PRESENT: normal capillary refill


GI/Abdominal exam: PRESENT: normal bowel sounds, soft.  ABSENT: distended, 

guarding, rebound, tenderness


Extremities exam: PRESENT: other - There is a puncture wound on the dorsal 

lateral aspect of the right forearm about 8 cm distal to the elbow.  There is 

obvious swelling around the wound with serosanguineous fluid oozing from it.  

Erythema and swelling from proximal to the elbow down to his hand..  ABSENT: 

clubbing, pedal edema


Neurological exam: PRESENT: alert, awake, oriented to person, oriented to place,

oriented to time, oriented to situation, CN II-XII grossly intact.  ABSENT: 

motor sensory deficit


Psychiatric exam: PRESENT: appropriate affect, normal mood


Skin exam: PRESENT: dry, warm, other - Right upper extremity as previously noted





Results


Laboratory Results: 


                                        





                                 11/20/20 04:24 





                                 11/20/20 09:45 





                                        











  11/20/20





  09:45


 


Creatinine  0.53


 


Est GFR ( Amer)  > 60











Impressions: 


                                        





Upper Extremity MRI  11/17/20 11:06


IMPRESSION:


 


1.  Diffuse edema overlying the right forearm which may represent


cellulitis, but without evidence of abscess.


2.  Surgical packing material within the subcutaneous tissues of


the dorsal mid forearm. No additional foreign body is seen.


 














Assessment and Plan





- Diagnosis


(1) Cellulitis of right forearm


Is this a current diagnosis for this admission?: Yes   





(2) Swelling of right upper extremity


Is this a current diagnosis for this admission?: Yes   





- Plan Summary


Summary: 


He is being seen by orthopedics.  An MRI will be done to determine whether or 

not he has evidence of deep abscess.  We will start him empirically on Keflex 

and doxycycline to cover for typical skin organisms as well as to cover him for 

organisms to which he could have become exposed due to the salt water 

environment.  We will follow-up blood cultures and orthopedics recommendations.








- Time


Time Spent with patient: 35 or more minutes


Anticipated Discharge Disposition: Home, Self Care


Anticipated Discharge Timeframe: Undetermin





- Inpatient Certification


Based on my medical assessment, after consideration of the patient's 

comorbidities, presenting symptoms, or acuity I expect that the services needed 

warrant INPATIENT care.: Yes


I certify that my determination is in accordance with my understanding of 

Medicare's requirements for reasonable and necessary INPATIENT services [42 CFR 

412.3e].: Yes


Medical Necessity: Need Close Monitoring Due to Risk of Patient Decompensation, 

Need For IV Fluids, Need For Continuous Telemetry Monitoring, Need for Pain 

Control, Need for IV Antibiotics, Need for Surgery, Risk of Complication if Not 

Cared For in Hospital

## 2020-11-22 VITALS — SYSTOLIC BLOOD PRESSURE: 111 MMHG | DIASTOLIC BLOOD PRESSURE: 61 MMHG

## 2020-11-22 RX ADMIN — CIPROFLOXACIN HYDROCHLORIDE SCH MG: 750 TABLET, FILM COATED ORAL at 09:16

## 2020-11-22 RX ADMIN — METHADONE HYDROCHLORIDE SCH MG: 10 TABLET ORAL at 06:44

## 2020-11-22 RX ADMIN — SULFAMETHOXAZOLE AND TRIMETHOPRIM SCH TAB: 800; 160 TABLET ORAL at 09:16

## 2020-11-22 RX ADMIN — KETOROLAC TROMETHAMINE SCH MG: 30 INJECTION, SOLUTION INTRAMUSCULAR at 08:28

## 2020-11-22 RX ADMIN — KETOROLAC TROMETHAMINE SCH MG: 30 INJECTION, SOLUTION INTRAMUSCULAR at 02:04

## 2020-11-22 NOTE — PDOC DISCHARGE SUMMARY
Impression





- Admit/DC Date/PCP


Admission Date/Primary Care Provider: 


  11/17/20 12:04





  





Discharge Date: 11/22/20





- Discharge Diagnosis


(1) Cellulitis of right forearm


Is this a current diagnosis for this admission?: Yes   





(2) Abscess of right forearm


Is this a current diagnosis for this admission?: Yes   





(3) Bacteremia due to Gram-positive bacteria


Is this a current diagnosis for this admission?: Yes   





(4) Intravenous drug abuse, continuous


Is this a current diagnosis for this admission?: Yes   





- Assessment


Summary: 


He is being seen by orthopedics.  An MRI will be done to determine whether or 

not he has evidence of deep abscess.  We will start him empirically on Keflex 

and doxycycline to cover for typical skin organisms as well as to cover him for 

organisms to which he could have become exposed due to the salt water 

environment.  We will follow-up blood cultures and orthopedics recommendations.








- Additional Information


Resuscitation Status: Full Code


Discharge Diet: As Tolerated


Discharge Activity: Activity As Tolerated


Referrals: 


PAOLO LARA DO [ACTIVE STAFF] - 


()


Prescriptions: 


Ketorolac Tromethamine [Toradol 10 mg Tablet] 10 mg PO Q6HP PRN #20 tablet


 PRN Reason: For Pain Scale 2-4


Ciprofloxacin HCl [Cipro 750 mg Tablet] 750 mg PO Q12 #20 tablet


Sulfamethoxazole/Trimethoprim [Septra-Ds 800-160 mg Tablet] 1 tab PO BID #20 

tablet


Home Medications: 








Ciprofloxacin HCl [Cipro 750 mg Tablet] 750 mg PO Q12 #20 tablet 11/22/20 


Ketorolac Tromethamine [Toradol 10 mg Tablet] 10 mg PO Q6HP PRN #20 tablet 

11/22/20 


Sulfamethoxazole/Trimethoprim [Septra-Ds 800-160 mg Tablet] 1 tab PO BID #20 

tablet 11/22/20 











History of Present Illiness


History of Present Illness: 


SHERRY BALBUENA is a 40 year old male


This patient was admitted apparently after having sustained a stingray puncture 

wound.  He presented to the hospital with cellulitis of his right upper 

extremity.  Because of the extent of his cellulitis was admitted for further 

management.





Hospital Course


Hospital Course: 


She was admitted for further management.  He was seen by orthopedic service and 

he had debridement done due to the extent of his wounds.  Cultures obtained From

the wound grew MRSA, and Serratia and blood culture yielded Aerococcus.  Patient

also had blood culture done the day prior to admission which yielded Candida 

krusei.  Patient had no evidence of fungemia clinically and there was also a 

thought that his bacteremia may have been a contaminant however patient does 

have a history of IVDA and so repeat blood cultures were done on November 20 

which so far after 48 hours have been negative.  Echocardiogram was also done 

due to patient's history of IVDA with no evidence of vegetations on TTE.  He 

received oral methadone in hospital as he was beginning to undergo withdrawal 

symptoms and this seemed to control him pretty well.  He was advised on the need

to abstain from further drug abuse and he says he is already got counseling and 

treatment set up as outpatient.  Patient was treated with IV antibiotics while 

in hospital.  An infectious disease consult was done and suggestion for oral 

antibiotics, ciprofloxacin as well as Bactrim was made so patient was changed 

over to these oral antibiotics and is being discharged home on the same.  He is 

to follow-up with orthopedics in about 10 days.  He remained afebrile throughout

his hospital stay and at this time with no further interventions been planned 

with clean wounds he is being discharged home for outpatient follow-up.





Physical Exam


Vital Signs: 


                                        











Temp Pulse Resp BP Pulse Ox


 


 97.8 F   56 L  17   111/61   100 


 


 11/22/20 12:34  11/22/20 12:34  11/22/20 12:34  11/22/20 12:34  11/22/20 12:34








                                 Intake & Output











 11/21/20 11/22/20 11/23/20





 06:59 06:59 06:59


 


Intake Total 2110 2436 360


 


Output Total  100 


 


Balance 2110 2336 360


 


Weight 81.8 kg 83.2 kg 











General appearance: PRESENT: no acute distress, well-developed, well-nourished


Head exam: PRESENT: atraumatic, normocephalic


Eye exam: PRESENT: conjunctiva pink, EOMI, PERRLA.  ABSENT: scleral icterus


Mouth exam: PRESENT: moist, tongue midline


Neck exam: ABSENT: carotid bruit, JVD, lymphadenopathy, thyromegaly


Respiratory exam: PRESENT: clear to auscultation stephanie.  ABSENT: rales, rhonchi, 

wheezes


Cardiovascular exam: PRESENT: RRR.  ABSENT: diastolic murmur, rubs, systolic 

murmur


Pulses: PRESENT: normal dorsalis pedis pul


Vascular exam: PRESENT: normal capillary refill


GI/Abdominal exam: PRESENT: normal bowel sounds, soft.  ABSENT: distended, 

guarding, mass, organolmegaly, rebound, tenderness


Rectal exam: PRESENT: deferred


Extremities exam: PRESENT: full ROM, other - RUE erythema and swelling improved,

fingers mobile.  ABSENT: calf tenderness, clubbing, pedal edema


Musculoskeletal exam: PRESENT: ambulatory


Neurological exam: PRESENT: alert, awake, oriented to person, oriented to place,

oriented to time, oriented to situation, CN II-XII grossly intact.  ABSENT: 

motor sensory deficit


Psychiatric exam: PRESENT: appropriate affect, normal mood.  ABSENT: homicidal 

ideation, suicidal ideation


Skin exam: PRESENT: dry, intact, warm.  ABSENT: cyanosis, rash





Results


Laboratory Results: 


                                        











WBC  8.6 10^3/uL (4.0-10.5)   11/20/20  04:24    


 


RBC  4.43 10^6/uL (4.35-5.55)   11/20/20  04:24    


 


Hgb  12.5 g/dL (13.5-17.0)  L  11/20/20  04:24    


 


Hct  37.3 % (37.9-51.0)  L  11/20/20  04:24    


 


MCV  84 fl (80-97)   11/20/20  04:24    


 


MCH  28.1 pg (27.0-33.4)   11/20/20  04:24    


 


MCHC  33.4 g/dL (32.0-36.0)   11/20/20  04:24    


 


RDW  13.6 % (11.5-14.0)   11/20/20  04:24    


 


Plt Count  287 10^3/uL (150-450)   11/20/20  04:24    


 


Lymph % (Auto)  6.7 % (13-45)  L  11/17/20  10:08    


 


Mono % (Auto)  10.0 % (3-13)   11/17/20  10:08    


 


Eos % (Auto)  0.1 % (0-6)   11/17/20  10:08    


 


Baso % (Auto)  0.4 % (0-2)   11/17/20  10:08    


 


Absolute Neuts (auto)  10.3 10^3/uL (1.7-8.2)  H  11/17/20  10:08    


 


Absolute Lymphs (auto)  0.8 10^3/uL (0.5-4.7)   11/17/20  10:08    


 


Absolute Monos (auto)  1.2 10^3/uL (0.1-1.4)   11/17/20  10:08    


 


Absolute Eos (auto)  0.0 10^3/uL (0.0-0.6)   11/17/20  10:08    


 


Absolute Basos (auto)  0.0 10^3/uL (0.0-0.2)   11/17/20  10:08    


 


Seg Neutrophils %  82.8 % (42-78)  H  11/17/20  10:08    


 


ESR  31 mm/hr (0-15)  H  11/19/20  12:30    


 


PT  15.3 SEC (11.4-15.4)   11/17/20  10:08    


 


INR  1.19   11/17/20  10:08    


 


Sodium  138.1 mmol/L (137-145)   11/20/20  04:24    


 


Potassium  4.7 mmol/L (3.6-5.0)   11/20/20  04:24    


 


Chloride  105 mmol/L ()   11/20/20  04:24    


 


Carbon Dioxide  22 mmol/L (22-30)   11/20/20  04:24    


 


Anion Gap  11  (5-19)   11/20/20  04:24    


 


BUN  14 mg/dL (7-20)   11/20/20  04:24    


 


Creatinine  0.53 mg/dL (0.52-1.25)   11/20/20  09:45    


 


Est GFR ( Amer)  > 60  (>60)   11/20/20  09:45    


 


Est GFR (MDRD) Non-Af  > 60  (>60)   11/20/20  09:45    


 


Glucose  89 mg/dL ()   11/20/20  04:24    


 


Lactic Acid  1.6 mmol/L (0.7-2.1)   11/17/20  10:08    


 


Calcium  8.9 mg/dL (8.4-10.2)   11/20/20  04:24    


 


Total Bilirubin  0.6 mg/dL (0.2-1.3)   11/17/20  10:08    


 


Direct Bilirubin  0.2 mg/dL (0.0-0.4)   11/17/20  10:08    


 


Neonat Total Bilirubin  Not Reportable   11/17/20  10:08    


 


Neonat Direct Bilirubin  Not Reportable   11/17/20  10:08    


 


Neonat Indirect Bili  Not Reportable   11/17/20  10:08    


 


AST  45 U/L (17-59)   11/17/20  10:08    


 


ALT  78 U/L (<50)  H  11/17/20  10:08    


 


Alkaline Phosphatase  72 U/L ()   11/17/20  10:08    


 


C-Reactive Protein  51.2 mg/L (<10.0)  H  11/19/20  12:30    


 


Total Protein  6.1 g/dL (6.3-8.2)  L  11/17/20  10:08    


 


Albumin  3.2 g/dL (3.5-5.0)  L  11/17/20  10:08    


 


Time Trough Drawn  0945   11/20/20  09:45    


 


Vancomycin Trough  9.2 ug/mL (5.0-20.0)   11/20/20  09:45    


 


SARS-CoV-2 (PCR)  NEGATIVE  (NEGATIVE)   11/17/20  14:55    











Impressions: 


                                        





Upper Extremity MRI  11/17/20 11:06


IMPRESSION:


 


1.  Diffuse edema overlying the right forearm which may represent


cellulitis, but without evidence of abscess.


2.  Surgical packing material within the subcutaneous tissues of


the dorsal mid forearm. No additional foreign body is seen.


 














Plan


Health Concerns: 


She needs to follow-up for management of his drug abuse


Time Spent: Greater than 30 Minutes





Stroke


Is this a Stroke Patient?: No





Acute Heart Failure


Is this a Heart Failure Patient?: No